# Patient Record
Sex: FEMALE | Race: WHITE | NOT HISPANIC OR LATINO | Employment: FULL TIME | URBAN - METROPOLITAN AREA
[De-identification: names, ages, dates, MRNs, and addresses within clinical notes are randomized per-mention and may not be internally consistent; named-entity substitution may affect disease eponyms.]

---

## 2022-12-06 ENCOUNTER — OFFICE VISIT (OUTPATIENT)
Dept: URGENT CARE | Facility: CLINIC | Age: 32
End: 2022-12-06
Payer: COMMERCIAL

## 2022-12-06 VITALS
HEART RATE: 81 BPM | WEIGHT: 145 LBS | OXYGEN SATURATION: 97 % | RESPIRATION RATE: 18 BRPM | SYSTOLIC BLOOD PRESSURE: 95 MMHG | DIASTOLIC BLOOD PRESSURE: 56 MMHG | TEMPERATURE: 99 F | HEIGHT: 64 IN | BODY MASS INDEX: 24.75 KG/M2

## 2022-12-06 DIAGNOSIS — H65.22 LEFT CHRONIC SEROUS OTITIS MEDIA: ICD-10-CM

## 2022-12-06 DIAGNOSIS — H92.02 ACUTE OTALGIA, LEFT: Primary | ICD-10-CM

## 2022-12-06 PROCEDURE — 99203 OFFICE O/P NEW LOW 30 MIN: CPT | Mod: S$GLB,,, | Performed by: NURSE PRACTITIONER

## 2022-12-06 PROCEDURE — 99203 PR OFFICE/OUTPT VISIT, NEW, LEVL III, 30-44 MIN: ICD-10-PCS | Mod: S$GLB,,, | Performed by: NURSE PRACTITIONER

## 2022-12-06 RX ORDER — FLUTICASONE PROPIONATE 50 MCG
1 SPRAY, SUSPENSION (ML) NASAL DAILY
Qty: 18.2 ML | Refills: 0 | Status: SHIPPED | OUTPATIENT
Start: 2022-12-06 | End: 2022-12-13

## 2022-12-06 RX ORDER — CEPHALEXIN 500 MG/1
500 CAPSULE ORAL EVERY 12 HOURS
Qty: 14 CAPSULE | Refills: 0 | Status: SHIPPED | OUTPATIENT
Start: 2022-12-06 | End: 2022-12-13

## 2022-12-06 RX ORDER — AZELASTINE 1 MG/ML
1 SPRAY, METERED NASAL 2 TIMES DAILY
Qty: 30 ML | Refills: 0 | Status: SHIPPED | OUTPATIENT
Start: 2022-12-06 | End: 2023-10-10 | Stop reason: ALTCHOICE

## 2022-12-06 NOTE — PROGRESS NOTES
"Subjective:       Patient ID: Leanna Yee is a 32 y.o. female.    Vitals:  height is 5' 4" (1.626 m) and weight is 65.8 kg (145 lb). Her temperature is 98.6 °F (37 °C). Her blood pressure is 95/56 (abnormal) and her pulse is 81. Her respiration is 18 and oxygen saturation is 97%.     Chief Complaint: Otalgia    Pt states ear infection in October. Says ear is tender to touch, if touched at the back of ear and front.   Provider note begins below    Patient reports left ear pain that has been going on for weeks.  She was treated with Augmentin for an ear infection.  She states that is improved but has been a mild persistent pain.  She is been taking Mucinex D every day.  Patient states she moved down here from [place].    Otalgia   There is pain in the left ear. This is a new problem. The current episode started 1 to 4 weeks ago. The problem occurs constantly. The problem has been waxing and waning. There has been no fever. The pain is at a severity of 3/10. The pain is mild. Associated symptoms include coughing, headaches and neck pain. Pertinent negatives include no abdominal pain, diarrhea, ear discharge, hearing loss, rash, rhinorrhea, sore throat or vomiting. Associated symptoms comments: Dry cough  . She has tried antibiotics (augmentin) for the symptoms. The treatment provided mild relief. There is no history of a chronic ear infection, hearing loss or a tympanostomy tube.     HENT:  Positive for ear pain, tinnitus and sinus pressure. Negative for ear discharge, hearing loss, postnasal drip and sore throat.    Neck: Positive for neck pain.   Respiratory:  Positive for cough.    Gastrointestinal:  Negative for abdominal pain, vomiting and diarrhea.   Skin:  Negative for rash.   Neurological:  Positive for headaches.     Objective:      Physical Exam   Constitutional: She is oriented to person, place, and time.   HENT:   Head: Normocephalic and atraumatic.   Ears:   Right Ear: Tympanic membrane, external " ear and ear canal normal.   Left Ear: Tympanic membrane, external ear and ear canal normal.   Nose: Rhinorrhea and congestion present.   Mouth/Throat: Dental caries present.       Cardiovascular: Normal rate.   Pulmonary/Chest: Effort normal. No respiratory distress.   Abdominal: Normal appearance.   Neurological: She is alert and oriented to person, place, and time.   Skin: Skin is warm and dry.   Psychiatric: Her behavior is normal. Mood normal.       Assessment:       1. Acute otalgia, left    2. Left chronic serous otitis media          Plan:       Patient reports the pain is mild but persistent.    No redness noted in ear   Differentials include Barotrauma versus serous otitis media versus dental infection  Referral for ENT  ED precautions        Acute otalgia, left  -     fluticasone propionate (FLONASE) 50 mcg/actuation nasal spray; 1 spray (50 mcg total) by Each Nostril route once daily. for 7 days  Dispense: 18.2 mL; Refill: 0  -     azelastine (ASTELIN) 137 mcg (0.1 %) nasal spray; 1 spray (137 mcg total) by Nasal route 2 (two) times daily.  Dispense: 30 mL; Refill: 0  -     Ambulatory referral/consult to ENT  -     cephALEXin (KEFLEX) 500 MG capsule; Take 1 capsule (500 mg total) by mouth every 12 (twelve) hours. for 7 days  Dispense: 14 capsule; Refill: 0    Left chronic serous otitis media  -     fluticasone propionate (FLONASE) 50 mcg/actuation nasal spray; 1 spray (50 mcg total) by Each Nostril route once daily. for 7 days  Dispense: 18.2 mL; Refill: 0  -     azelastine (ASTELIN) 137 mcg (0.1 %) nasal spray; 1 spray (137 mcg total) by Nasal route 2 (two) times daily.  Dispense: 30 mL; Refill: 0  -     Ambulatory referral/consult to ENT  -     cephALEXin (KEFLEX) 500 MG capsule; Take 1 capsule (500 mg total) by mouth every 12 (twelve) hours. for 7 days  Dispense: 14 capsule; Refill: 0

## 2023-02-01 ENCOUNTER — OFFICE VISIT (OUTPATIENT)
Dept: OTOLARYNGOLOGY | Facility: CLINIC | Age: 33
End: 2023-02-01
Payer: COMMERCIAL

## 2023-02-01 VITALS — SYSTOLIC BLOOD PRESSURE: 105 MMHG | DIASTOLIC BLOOD PRESSURE: 70 MMHG | HEART RATE: 95 BPM

## 2023-02-01 DIAGNOSIS — J30.9 ALLERGIC RHINITIS, UNSPECIFIED SEASONALITY, UNSPECIFIED TRIGGER: ICD-10-CM

## 2023-02-01 DIAGNOSIS — H92.03 EAR DISCOMFORT, BILATERAL: ICD-10-CM

## 2023-02-01 DIAGNOSIS — R09.81 NASAL CONGESTION: Primary | ICD-10-CM

## 2023-02-01 DIAGNOSIS — L29.9 EAR ITCHING: ICD-10-CM

## 2023-02-01 PROCEDURE — 99999 PR PBB SHADOW E&M-EST. PATIENT-LVL III: CPT | Mod: PBBFAC,,, | Performed by: OTOLARYNGOLOGY

## 2023-02-01 PROCEDURE — 3078F DIAST BP <80 MM HG: CPT | Mod: CPTII,S$GLB,, | Performed by: OTOLARYNGOLOGY

## 2023-02-01 PROCEDURE — 3078F PR MOST RECENT DIASTOLIC BLOOD PRESSURE < 80 MM HG: ICD-10-PCS | Mod: CPTII,S$GLB,, | Performed by: OTOLARYNGOLOGY

## 2023-02-01 PROCEDURE — 99203 OFFICE O/P NEW LOW 30 MIN: CPT | Mod: S$GLB,,, | Performed by: OTOLARYNGOLOGY

## 2023-02-01 PROCEDURE — 1159F MED LIST DOCD IN RCRD: CPT | Mod: CPTII,S$GLB,, | Performed by: OTOLARYNGOLOGY

## 2023-02-01 PROCEDURE — 1159F PR MEDICATION LIST DOCUMENTED IN MEDICAL RECORD: ICD-10-PCS | Mod: CPTII,S$GLB,, | Performed by: OTOLARYNGOLOGY

## 2023-02-01 PROCEDURE — 99999 PR PBB SHADOW E&M-EST. PATIENT-LVL III: ICD-10-PCS | Mod: PBBFAC,,, | Performed by: OTOLARYNGOLOGY

## 2023-02-01 PROCEDURE — 3074F SYST BP LT 130 MM HG: CPT | Mod: CPTII,S$GLB,, | Performed by: OTOLARYNGOLOGY

## 2023-02-01 PROCEDURE — 99203 PR OFFICE/OUTPT VISIT, NEW, LEVL III, 30-44 MIN: ICD-10-PCS | Mod: S$GLB,,, | Performed by: OTOLARYNGOLOGY

## 2023-02-01 PROCEDURE — 1160F PR REVIEW ALL MEDS BY PRESCRIBER/CLIN PHARMACIST DOCUMENTED: ICD-10-PCS | Mod: CPTII,S$GLB,, | Performed by: OTOLARYNGOLOGY

## 2023-02-01 PROCEDURE — 1160F RVW MEDS BY RX/DR IN RCRD: CPT | Mod: CPTII,S$GLB,, | Performed by: OTOLARYNGOLOGY

## 2023-02-01 PROCEDURE — 3074F PR MOST RECENT SYSTOLIC BLOOD PRESSURE < 130 MM HG: ICD-10-PCS | Mod: CPTII,S$GLB,, | Performed by: OTOLARYNGOLOGY

## 2023-02-01 RX ORDER — AMOXICILLIN AND CLAVULANATE POTASSIUM 875; 125 MG/1; MG/1
TABLET, FILM COATED ORAL
COMMUNITY
End: 2023-10-10 | Stop reason: ALTCHOICE

## 2023-02-01 NOTE — PATIENT INSTRUCTIONS
Reviewed possible causes of ear itching and intermittent discomfort including ear bud pressure, possible TMJ issues.    Discussed congestion issues - possible allergies.  Flonase or Nasacort- 2 sprays each nostril daily. We discussed how to apply it appropriately by placing the spray nozzle inside the nostril pointing above the ear on the same side and gently breathing in through the nose (do NOT snort).    If ear and congestion problems persist or worsen, return to follow up visit.

## 2023-02-01 NOTE — PROGRESS NOTES
32-year-old female presents for evaluation of intermittent ear discomfort and itching.  She has noticed now that she has moved back to [place] from [place] that these problems have been persistent since about mid to late October 2022.  She also notices being congested in the morning.  She sometimes will have a headache.  This morning she then woke up with with a sore throat.  Prior to October she never really had ear issues or ear infections.  In the Fall, while in [place], she had ear pain was seen in urgent care & was told it was an ear inf (not sure if middle or outer); then was seen in  here in [facility] and was no significant findings & recommended here.   The left ear has been more troublesome.  She does not have any active drainage. No prior ENT surgeries.  Does not sneeze or itch a lot. No q-tips. Does wear ear buds nearly all day.    PMHx, PSHx, Meds, Allergies, SocHx, FamHx reviewed in EPIC    Review of Systems     Constitutional: Negative for chills and fever.      HENT: Positive for ear discharge, ear pain, sore throat and stuffy nose.  Negative for hearing loss, ringing in the ears and sinus infection.  Ear itch      Respiratory:  Negative for cough and shortness of breath.      Neurological: Positive for headaches.     PE: /70   Pulse 95    Gen: female, well nourished, well developed, NAD, cooperative, good historian  Ears:  R EAC w/ min cerumen on right with thinned skin inferior and lateral, L EAC with min thin layer of cerumen with thin skin inf & lateral, no erythema or flaky skin & TM translucent with normal bony landmarks bilaterally  Nose: external nose tension configuration, nasal septum mild undulation w/o obstruction, inferior turbinates mild edema, min clear rhinorrhea, no visible purulence or polyps  OC/OP:  MMM, tongue protrudes midline, palate raises symmetrically, tonsils small and symmetric, no erythema or exudate  Neck: supple, no TTP, no LAD or masses  Face:  no TTP, no  erythema or flushing  Respiratory: Breathing comfortably without retractions  Skin: facial skin intact without visible lesions or flushing  Lymph: no neck lympadenopathy  Neuro:  facial movement symmetric, speech fluid, gait stable, tongue protrudes midline  Psych: alert & oriented x 3, reasonable, normal affect    Impression:   1. Nasal congestion        2. Ear itching        3. Ear discomfort, bilateral        4. Allergic rhinitis, unspecified seasonality, unspecified trigger      possible          Discussion and Plan:       Reviewed possible causes of ear itching and intermittent discomfort including ear bud pressure, possible TMJ issues. If discomfort returns pt will assess for TMJ tenderness.    Discussed congestion issues - possible allergies.  Flonase or Nasacort 2 sprays each nostril daily. We discussed how to apply it appropriately by placing the spray nozzle inside the nostril pointing above the ear on the same side and gently breathing in through the nose (do NOT snort).    If ear and congestion problems persist or worsen, return to follow up visit.      Parts or all of this note were created by voice recognition software; typographical errors in translating may be present.

## 2023-08-08 ENCOUNTER — OFFICE VISIT (OUTPATIENT)
Dept: OBSTETRICS AND GYNECOLOGY | Facility: CLINIC | Age: 33
End: 2023-08-08
Payer: COMMERCIAL

## 2023-08-08 VITALS — WEIGHT: 154.31 LBS | HEIGHT: 64 IN | BODY MASS INDEX: 26.34 KG/M2

## 2023-08-08 DIAGNOSIS — Z01.419 ENCOUNTER FOR WELL WOMAN EXAM WITH ROUTINE GYNECOLOGICAL EXAM: Primary | ICD-10-CM

## 2023-08-08 PROCEDURE — 1159F PR MEDICATION LIST DOCUMENTED IN MEDICAL RECORD: ICD-10-PCS | Mod: CPTII,S$GLB,, | Performed by: OBSTETRICS & GYNECOLOGY

## 2023-08-08 PROCEDURE — 1160F RVW MEDS BY RX/DR IN RCRD: CPT | Mod: CPTII,S$GLB,, | Performed by: OBSTETRICS & GYNECOLOGY

## 2023-08-08 PROCEDURE — 87624 HPV HI-RISK TYP POOLED RSLT: CPT | Performed by: OBSTETRICS & GYNECOLOGY

## 2023-08-08 PROCEDURE — 99385 PR PREVENTIVE VISIT,NEW,18-39: ICD-10-PCS | Mod: S$GLB,,, | Performed by: OBSTETRICS & GYNECOLOGY

## 2023-08-08 PROCEDURE — 88175 CYTOPATH C/V AUTO FLUID REDO: CPT | Performed by: OBSTETRICS & GYNECOLOGY

## 2023-08-08 PROCEDURE — 99385 PREV VISIT NEW AGE 18-39: CPT | Mod: S$GLB,,, | Performed by: OBSTETRICS & GYNECOLOGY

## 2023-08-08 PROCEDURE — 3008F PR BODY MASS INDEX (BMI) DOCUMENTED: ICD-10-PCS | Mod: CPTII,S$GLB,, | Performed by: OBSTETRICS & GYNECOLOGY

## 2023-08-08 PROCEDURE — 1160F PR REVIEW ALL MEDS BY PRESCRIBER/CLIN PHARMACIST DOCUMENTED: ICD-10-PCS | Mod: CPTII,S$GLB,, | Performed by: OBSTETRICS & GYNECOLOGY

## 2023-08-08 PROCEDURE — 99999 PR PBB SHADOW E&M-EST. PATIENT-LVL III: ICD-10-PCS | Mod: PBBFAC,,, | Performed by: OBSTETRICS & GYNECOLOGY

## 2023-08-08 PROCEDURE — 1159F MED LIST DOCD IN RCRD: CPT | Mod: CPTII,S$GLB,, | Performed by: OBSTETRICS & GYNECOLOGY

## 2023-08-08 PROCEDURE — 3008F BODY MASS INDEX DOCD: CPT | Mod: CPTII,S$GLB,, | Performed by: OBSTETRICS & GYNECOLOGY

## 2023-08-08 PROCEDURE — 99999 PR PBB SHADOW E&M-EST. PATIENT-LVL III: CPT | Mod: PBBFAC,,, | Performed by: OBSTETRICS & GYNECOLOGY

## 2023-08-08 RX ORDER — SPIRONOLACTONE 100 MG/1
100 TABLET, FILM COATED ORAL DAILY
COMMUNITY

## 2023-08-08 NOTE — PROGRESS NOTES
History & Physical  Gynecology      SUBJECTIVE:     Chief Complaint: Gynecologic Exam (Annual exam )       History of Present Illness:  Leanna Yee is a 33 y.o. female  here for annual routine Pap and checkup. Patient's last menstrual period was 2023 (exact date)..  She has no unusual complaints.      She describes her periods as regular, lasting 5-7 days. normal flow.  denies break through bleeding.   denies vaginal itching or irritation.  Notes intermittent vaginal itching.  Has had yeast infections in the past  denies vaginal discharge.    She is sexually active with 1 partner (male)  She uses IUD for contraception.  Paragard IUD placed 2022    History of abnormal pap: No  Last Pap: 2022  Last MMG: No  Last Colonoscopy:  No        Review of patient's allergies indicates:   Allergen Reactions    Prochlorperazine        History reviewed. No pertinent past medical history.  History reviewed. No pertinent surgical history.  OB History          0    Para   0    Term   0       0    AB   0    Living   0         SAB   0    IAB   0    Ectopic   0    Multiple   0    Live Births   0               Family History   Problem Relation Age of Onset    Heart disease Father      Social History     Tobacco Use    Smoking status: Never     Passive exposure: Never    Smokeless tobacco: Never   Substance Use Topics    Alcohol use: Not Currently    Drug use: Never       Current Outpatient Medications   Medication Sig    spironolactone (ALDACTONE) 100 MG tablet Take 100 mg by mouth once daily.    amoxicillin-clavulanate 875-125mg (AUGMENTIN) 875-125 mg per tablet Augmentin 875 mg-125 mg tablet   Take 1 tablet every 12 hours by oral route.    azelastine (ASTELIN) 137 mcg (0.1 %) nasal spray 1 spray (137 mcg total) by Nasal route 2 (two) times daily.     No current facility-administered medications for this visit.         Review of Systems:  Review of Systems   Constitutional:  Negative for activity  change, appetite change, chills, fatigue, fever and unexpected weight change.   Respiratory:  Negative for cough, shortness of breath and wheezing.    Cardiovascular:  Negative for chest pain and leg swelling.   Gastrointestinal:  Negative for abdominal pain, constipation, diarrhea, nausea and vomiting.   Endocrine: Negative for hair loss and hot flashes.   Genitourinary:  Negative for decreased libido, dyspareunia, dysuria, frequency, menstrual problem, pelvic pain, vaginal bleeding, vaginal discharge and vaginal pain.   Integumentary:  Negative for acne, hair changes, nipple discharge and breast skin changes.   Neurological:  Negative for headaches.   Psychiatric/Behavioral:  Negative for sleep disturbance.    Breast: Negative for mastodynia, nipple discharge and skin changes       OBJECTIVE:     Physical Exam:  Physical Exam  Constitutional:       Appearance: She is well-developed.   HENT:      Head: Normocephalic and atraumatic.   Eyes:      General: No scleral icterus.        Right eye: No discharge.         Left eye: No discharge.      Conjunctiva/sclera: Conjunctivae normal.   Pulmonary:      Effort: Pulmonary effort is normal.      Breath sounds: No stridor.   Chest:      Chest wall: No mass or tenderness.   Breasts:     Breasts are symmetrical.      Right: No inverted nipple, mass, nipple discharge, skin change or tenderness.      Left: No inverted nipple, mass, nipple discharge, skin change or tenderness.   Abdominal:      General: There is no distension.      Palpations: Abdomen is soft.      Tenderness: There is no abdominal tenderness.   Genitourinary:     Labia:         Right: No rash, tenderness, lesion or injury.         Left: No rash, tenderness, lesion or injury.       Vagina: Normal.      Cervix: No cervical motion tenderness, discharge or friability.      Adnexa:         Right: No mass, tenderness or fullness.          Left: No mass, tenderness or fullness.        Comments: Normal external  genitalia.  Normal hair distribution.  Urethral meatus normal. No cervical lesions or masses.  IUD strings noted.  No vaginal bleeding noted.  No adnexal or uterine tenderness.  No palpable adnexal masses.    Musculoskeletal:         General: Normal range of motion.   Skin:     General: Skin is warm and dry.   Neurological:      Mental Status: She is alert and oriented to person, place, and time.   Psychiatric:         Behavior: Behavior normal.         Thought Content: Thought content normal.         Judgment: Judgment normal.           ASSESSMENT:       ICD-10-CM ICD-9-CM    1. Encounter for well woman exam with routine gynecological exam  Z01.419 V72.31 Liquid-Based Pap Smear, Screening      HPV High Risk Genotypes, PCR              Plan:      Leanna was seen today for gynecologic exam.    Diagnoses and all orders for this visit:    Encounter for well woman exam with routine gynecological exam  -     Liquid-Based Pap Smear, Screening  -     HPV High Risk Genotypes, PCR  - Cotesting today  - MMG and Cscope not indicated at this time  - IUD for contraception        Orders Placed This Encounter   Procedures    HPV High Risk Genotypes, PCR       No follow-ups on file.     Counseling time: 15 minutes    Merced Corbett

## 2023-08-15 LAB
FINAL PATHOLOGIC DIAGNOSIS: NORMAL
Lab: NORMAL

## 2023-08-16 LAB
HPV HR 12 DNA SPEC QL NAA+PROBE: NEGATIVE
HPV16 AG SPEC QL: NEGATIVE
HPV18 DNA SPEC QL NAA+PROBE: NEGATIVE

## 2023-10-10 ENCOUNTER — OFFICE VISIT (OUTPATIENT)
Dept: URGENT CARE | Facility: CLINIC | Age: 33
End: 2023-10-10
Payer: COMMERCIAL

## 2023-10-10 VITALS
HEART RATE: 89 BPM | BODY MASS INDEX: 26.29 KG/M2 | DIASTOLIC BLOOD PRESSURE: 65 MMHG | OXYGEN SATURATION: 99 % | TEMPERATURE: 98 F | RESPIRATION RATE: 18 BRPM | SYSTOLIC BLOOD PRESSURE: 98 MMHG | HEIGHT: 64 IN | WEIGHT: 154 LBS

## 2023-10-10 DIAGNOSIS — H65.01 NON-RECURRENT ACUTE SEROUS OTITIS MEDIA OF RIGHT EAR: Primary | ICD-10-CM

## 2023-10-10 DIAGNOSIS — H92.01 OTALGIA, RIGHT: ICD-10-CM

## 2023-10-10 DIAGNOSIS — J02.9 SORE THROAT: ICD-10-CM

## 2023-10-10 LAB
CTP QC/QA: YES
CTP QC/QA: YES
MOLECULAR STREP A: NEGATIVE
SARS-COV-2 AG RESP QL IA.RAPID: NEGATIVE

## 2023-10-10 PROCEDURE — 87811 SARS CORONAVIRUS 2 ANTIGEN POCT, MANUAL READ: ICD-10-PCS | Mod: QW,S$GLB,, | Performed by: NURSE PRACTITIONER

## 2023-10-10 PROCEDURE — 87651 STREP A DNA AMP PROBE: CPT | Mod: QW,S$GLB,, | Performed by: NURSE PRACTITIONER

## 2023-10-10 PROCEDURE — 87651 POCT STREP A MOLECULAR: ICD-10-PCS | Mod: QW,S$GLB,, | Performed by: NURSE PRACTITIONER

## 2023-10-10 PROCEDURE — 99213 OFFICE O/P EST LOW 20 MIN: CPT | Mod: S$GLB,,, | Performed by: NURSE PRACTITIONER

## 2023-10-10 PROCEDURE — 87811 SARS-COV-2 COVID19 W/OPTIC: CPT | Mod: QW,S$GLB,, | Performed by: NURSE PRACTITIONER

## 2023-10-10 PROCEDURE — 99213 PR OFFICE/OUTPT VISIT, EST, LEVL III, 20-29 MIN: ICD-10-PCS | Mod: S$GLB,,, | Performed by: NURSE PRACTITIONER

## 2023-10-10 RX ORDER — AMOXICILLIN AND CLAVULANATE POTASSIUM 875; 125 MG/1; MG/1
1 TABLET, FILM COATED ORAL 2 TIMES DAILY
Qty: 20 TABLET | Refills: 0 | Status: SHIPPED | OUTPATIENT
Start: 2023-10-10 | End: 2023-10-20

## 2023-10-10 RX ORDER — CLINDAMYCIN PHOSPHATE AND BENZOYL PEROXIDE 10; 50 MG/G; MG/G
GEL TOPICAL
COMMUNITY
Start: 2023-07-19

## 2023-10-10 RX ORDER — PREDNISONE 20 MG/1
20 TABLET ORAL DAILY
Qty: 3 TABLET | Refills: 0 | Status: SHIPPED | OUTPATIENT
Start: 2023-10-10 | End: 2023-10-13

## 2023-10-10 NOTE — PROGRESS NOTES
"Subjective:      Patient ID: Leanna Yee is a 33 y.o. female.    Vitals:  height is 5' 4" (1.626 m) and weight is 69.9 kg (154 lb). Her tympanic temperature is 98.1 °F (36.7 °C). Her blood pressure is 98/65 and her pulse is 89. Her respiration is 18 and oxygen saturation is 99%.     Chief Complaint: Sore Throat    Patient presents with c.o sore throat x 1 day. Patient reprts pain worse on the right side when swallowing which is accompanied with rt ear pain. Patient denies congestion, cough, or body aches. No fever.   Provider note begins below    Patient reports right ear pain.  Denies headaches or body aches.  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      Sore Throat   This is a new problem. The current episode started yesterday. The problem has been unchanged. The pain is worse on the right side. Associated symptoms include ear pain. Pertinent negatives include no congestion, coughing, diarrhea, ear discharge, shortness of breath or vomiting. She has had no exposure to strep. She has tried NSAIDs for the symptoms. The treatment provided no relief.       Constitution: Positive for fatigue. Negative for fever.   HENT:  Positive for ear pain and sore throat. Negative for ear discharge and congestion.    Cardiovascular:  Negative for chest pain and sob on exertion.   Respiratory:  Negative for cough and shortness of breath.    Gastrointestinal:  Negative for nausea, vomiting, constipation and diarrhea.   Musculoskeletal:  Negative for muscle ache.      Objective:     Physical Exam   Constitutional: She is oriented to person, place, and time.   HENT:   Head: Normocephalic and atraumatic.   Ears:   Right Ear: External ear and ear canal normal. There is tenderness. Tympanic membrane is not erythematous and not bulging. A middle ear effusion is present. impacted cerumen  Left Ear: Tympanic membrane, external ear and ear canal normal. Tympanic membrane is not erythematous and not bulging. impacted cerumen  Nose: No " rhinorrhea or congestion.   Mouth/Throat: Posterior oropharyngeal erythema present. No oropharyngeal exudate.   Cardiovascular: Normal rate and regular rhythm.   Pulmonary/Chest: Effort normal and breath sounds normal. No stridor. No respiratory distress. She has no wheezes. She has no rhonchi. She has no rales. She exhibits no tenderness.   Abdominal: Normal appearance.   Neurological: She is alert and oriented to person, place, and time.   Skin: Skin is warm and dry.   Psychiatric: Her behavior is normal. Mood normal.       Results for orders placed or performed in visit on 10/10/23   POCT Strep A, Molecular   Result Value Ref Range    Molecular Strep A, POC Negative Negative     Acceptable Yes    SARS Coronavirus 2 Antigen, POCT Manual Read   Result Value Ref Range    SARS Coronavirus 2 Antigen Negative Negative     Acceptable Yes       Assessment:     1. Non-recurrent acute serous otitis media of right ear    2. Sore throat    3. Otalgia, right        Plan:   Strep test negative   Covid test negative  Follow-up if symptoms worsen or do not improve  Sudafed 30 milligrams prior to getting on plane and during the day as needed for congestion  Serous otitis media- wait and see antibiotics      A cold is caused by a virus that can settle in your nose, throat or lungs. This causesa runny or stuffy nose and sneezing. You may also have a sore throat, cough, headache, fever and muscle aches. Different cold viruses last different lengthsof time, but the average time is 2 to 14 days.    Seek immediate medical care if you develop fever, chest pain, or shortness of breath.     Treatment: There is no cure for the common cold, there is only symptomatic care.     Antibiotics may be used to treat signs of a secondary infection, but they do not treat  the cold virus. Try these tips to keep yourself comfortable:                   -Get plenty of rest.                   -Drink plenty of fluids, at least  8 large glasses of fluid a day. Good fluid choices are water, fruit juices high in Vitamin C, tea, gelatin, or broths and soups. These help to keep mucus thin and ease congestion.                  -Use salt water gargle, cough drops or throat sprays to relieve throat pain. Mi ¼ to ½ teaspoon of salt in 1 cup of warm water for a salt water gargle  solution.                  -Use petroleum jelly or lip balm around lips and nose to prevent chapping.                  -Use saline nose drops or spray to help ease congestion.    Over the Counter (OTC) Medicines:  Take over the counter medicines as needed to ease your signs.  Read labels carefully.  Use a product that treats only the signs that you have. Ask your pharmacist  for recommendations. Be sure to ask about possible interactions with other  medicines you are taking.  Common medicines used to treat signs of a cold include:    #Antihistamines that dry secretions in your nose and lungs. Some of these  may cause you to feel drowsy. Talk to your pharmacist before use if you  have glaucoma or an enlarged prostate.  Names of some medicines in this group include:  - Diphenhydramine  - Brompheniramine  - Chlorpheniramine  - Clemastine    # Decongestants that tighten blood vessels in your nose to decrease  stuffiness and pressure. Use nasal spray decongestants for up to three days  only. Longer use can make congestion worse. Talk to your pharmacist  before use if you have high blood pressure, heart disease, diabetes or an  enlarged prostate.    Names of some medicines in this group include:  - Pseudoephedrine (Sudafed)- kept behind the counter and requires identification  to purchase in limited quantities because it can be used to make illegal  drugs  - Phenylephrine  - Oxymetazoline nasal spray (Afrin)  - Cough suppressant, also called antitussive, such as dextromethorphan.  This medicine decreases your reflex and sensitivity to cough. This  medicine may be kept behind the  pharmacy counter for purchase.  - Expectorant, sometimes called mucolytic, such as guaifenesin (mucinex). This  medicine thins mucus secretions in the lungs to make it easier for you to  cough up and out. (Be sure to drink plenty of fluids when taking this medication)  Cold and cough medicines often contain more than one type of medicine.  Ask the pharmacist for help to confirm that you are not using more than one  product with the same or similar ingredient. For example, some cold and  cough medicines have acetaminophen or ibuprofen in them to help lower a  fever or ease muscle aches. Do not take extra acetaminophen (Tylenol) or  ibuprofen (Advil, Motrin) if the cold or cough medicine has it as an  ingredient. Too much medicine could be harmful.    Take the correct dose as listed on the package. Do not take more than  recommended.    Use a Humidifier:  A cool mist humidifier can make breathing easier by thinning mucus. Do not use  a steam humidifier as hot water can cause burns if spilled.  Place the humidifier a few feet from the bed. Drain and clean each day with  soap and water to prevent bacteria and mold from growing.  Indoor humidity should not be above 50%. Stop using the humidifier if you  notice moisture on windows, walls or pictures.  You do not need to add any medicine to the humidifier.  If you cannot get a humidifier, place a pan of water next to heating vents and  refill the water level daily. The water will evaporate and add moisture to the  Room.    How to prevent the spread of colds  -Wash your hands with soap and water or use alcohol based hand   often. Dry hands wet from washing with soap on a paper towel instead of cloth towel.  -Cough or sneeze into your elbow to avoid spreading germs.  -Wipe down common surfaces, such as door knobs and faucet handles, with a disinfectant spray.  -Do not share cups or utensils.        -Flonase daily.  -Claritin or Zyrtec daily.  --3 days of  steroids.  -Magic mouthwash as needed for sore throat.     Please follow up with your Primary care provider within 2-5 days if your signs and symptoms have not resolved or worsen.      If your condition worsens or fails to improve we recommend that you receive another evaluation at the emergency room immediately or contact your primary medical clinic to discuss your concerns.   You must understand that you have received an Urgent Care treatment only and that you may be released before all of your medical problems are known or treated. You, the patient, will arrange for follow up care as instructed.            Non-recurrent acute serous otitis media of right ear  -     amoxicillin-clavulanate 875-125mg (AUGMENTIN) 875-125 mg per tablet; Take 1 tablet by mouth 2 (two) times daily. for 10 days  Dispense: 20 tablet; Refill: 0    Sore throat  -     POCT Strep A, Molecular  -     SARS Coronavirus 2 Antigen, POCT Manual Read  -     (Magic mouthwash) 1:1:1 diphenhydrAMINE(Benadryl) 12.5mg/5ml liq, aluminum & magnesium hydroxide-simethicone (Maalox), LIDOcaine viscous 2%; Swish and spit 10 mLs every 4 (four) hours as needed (sore throat).  Dispense: 200 mL; Refill: 0  -     predniSONE (DELTASONE) 20 MG tablet; Take 1 tablet (20 mg total) by mouth once daily. for 3 days  Dispense: 3 tablet; Refill: 0    Otalgia, right

## 2023-10-10 NOTE — PATIENT INSTRUCTIONS
Sudafed 30 mg 30 min prior to boarding plane    A cold is caused by a virus that can settle in your nose, throat or lungs. This causesa runny or stuffy nose and sneezing. You may also have a sore throat, cough, headache, fever and muscle aches. Different cold viruses last different lengthsof time, but the average time is 2 to 14 days.    Seek immediate medical care if you develop fever, chest pain, or shortness of breath.     Treatment: There is no cure for the common cold, there is only symptomatic care.     Antibiotics may be used to treat signs of a secondary infection, but they do not treat  the cold virus. Try these tips to keep yourself comfortable:                   -Get plenty of rest.                   -Drink plenty of fluids, at least 8 large glasses of fluid a day. Good fluid choices are water, fruit juices high in Vitamin C, tea, gelatin, or broths and soups. These help to keep mucus thin and ease congestion.                  -Use salt water gargle, cough drops or throat sprays to relieve throat pain. Mi ¼ to ½ teaspoon of salt in 1 cup of warm water for a salt water gargle  solution.                  -Use petroleum jelly or lip balm around lips and nose to prevent chapping.                  -Use saline nose drops or spray to help ease congestion.    Over the Counter (OTC) Medicines:  Take over the counter medicines as needed to ease your signs.  Read labels carefully.  Use a product that treats only the signs that you have. Ask your pharmacist  for recommendations. Be sure to ask about possible interactions with other  medicines you are taking.  Common medicines used to treat signs of a cold include:    #Antihistamines that dry secretions in your nose and lungs. Some of these  may cause you to feel drowsy. Talk to your pharmacist before use if you  have glaucoma or an enlarged prostate.  Names of some medicines in this group include:  - Diphenhydramine  - Brompheniramine  - Chlorpheniramine  -  Clemastine    # Decongestants that tighten blood vessels in your nose to decrease  stuffiness and pressure. Use nasal spray decongestants for up to three days  only. Longer use can make congestion worse. Talk to your pharmacist  before use if you have high blood pressure, heart disease, diabetes or an  enlarged prostate.    Names of some medicines in this group include:  - Pseudoephedrine (Sudafed)- kept behind the counter and requires identification  to purchase in limited quantities because it can be used to make illegal  drugs  - Phenylephrine  - Oxymetazoline nasal spray (Afrin)  - Cough suppressant, also called antitussive, such as dextromethorphan.  This medicine decreases your reflex and sensitivity to cough. This  medicine may be kept behind the pharmacy counter for purchase.  - Expectorant, sometimes called mucolytic, such as guaifenesin (mucinex). This  medicine thins mucus secretions in the lungs to make it easier for you to  cough up and out. (Be sure to drink plenty of fluids when taking this medication)  Cold and cough medicines often contain more than one type of medicine.  Ask the pharmacist for help to confirm that you are not using more than one  product with the same or similar ingredient. For example, some cold and  cough medicines have acetaminophen or ibuprofen in them to help lower a  fever or ease muscle aches. Do not take extra acetaminophen (Tylenol) or  ibuprofen (Advil, Motrin) if the cold or cough medicine has it as an  ingredient. Too much medicine could be harmful.    Take the correct dose as listed on the package. Do not take more than  recommended.    Use a Humidifier:  A cool mist humidifier can make breathing easier by thinning mucus. Do not use  a steam humidifier as hot water can cause burns if spilled.  Place the humidifier a few feet from the bed. Drain and clean each day with  soap and water to prevent bacteria and mold from growing.  Indoor humidity should not be above 50%.  Stop using the humidifier if you  notice moisture on windows, walls or pictures.  You do not need to add any medicine to the humidifier.  If you cannot get a humidifier, place a pan of water next to heating vents and  refill the water level daily. The water will evaporate and add moisture to the  Room.    How to prevent the spread of colds  -Wash your hands with soap and water or use alcohol based hand   often. Dry hands wet from washing with soap on a paper towel instead of cloth towel.  -Cough or sneeze into your elbow to avoid spreading germs.  -Wipe down common surfaces, such as door knobs and faucet handles, with a disinfectant spray.  -Do not share cups or utensils.        -Flonase daily.  -Claritin or Zyrtec daily.    -3 days of steroids.  -Magic mouthwash as needed for sore throat.     Please follow up with your Primary care provider within 2-5 days if your signs and symptoms have not resolved or worsen.      If your condition worsens or fails to improve we recommend that you receive another evaluation at the emergency room immediately or contact your primary medical clinic to discuss your concerns.   You must understand that you have received an Urgent Care treatment only and that you may be released before all of your medical problems are known or treated. You, the patient, will arrange for follow up care as instructed.

## 2024-01-24 ENCOUNTER — PROCEDURE VISIT (OUTPATIENT)
Dept: OBSTETRICS AND GYNECOLOGY | Facility: CLINIC | Age: 34
End: 2024-01-24
Payer: COMMERCIAL

## 2024-01-24 VITALS
SYSTOLIC BLOOD PRESSURE: 108 MMHG | WEIGHT: 149.94 LBS | HEIGHT: 64 IN | DIASTOLIC BLOOD PRESSURE: 68 MMHG | BODY MASS INDEX: 25.6 KG/M2

## 2024-01-24 DIAGNOSIS — Z30.432 ENCOUNTER FOR IUD REMOVAL: Primary | ICD-10-CM

## 2024-01-24 PROCEDURE — 99499 UNLISTED E&M SERVICE: CPT | Mod: S$GLB,,, | Performed by: STUDENT IN AN ORGANIZED HEALTH CARE EDUCATION/TRAINING PROGRAM

## 2024-01-24 PROCEDURE — 58301 REMOVE INTRAUTERINE DEVICE: CPT | Mod: S$GLB,,, | Performed by: STUDENT IN AN ORGANIZED HEALTH CARE EDUCATION/TRAINING PROGRAM

## 2024-01-24 NOTE — PROCEDURES
Removal of IUD    Date/Time: 1/24/2024 3:00 PM    Performed by: Grazyna Briceno MD  Authorized by: Grazyna Briceno MD    Consent given by:  Patient  Procedure risks and benefits discussed: yes    Patient questions answered: yes    Patient agrees, verbalizes understanding, and wants to proceed: yes    Implant grasped by: ring forceps  Removal due to infection and inflammatory reaction: no    Other reason for removal:  Patient desires conception  Removal due to mechanical complications of IUD/Nexplanon: no    Removed with no complications: yes     Recommend discontinuing spironolactone and starting PNV.no      Grazyna Briceno MD

## 2024-03-04 ENCOUNTER — OFFICE VISIT (OUTPATIENT)
Dept: OBSTETRICS AND GYNECOLOGY | Facility: CLINIC | Age: 34
End: 2024-03-04
Attending: STUDENT IN AN ORGANIZED HEALTH CARE EDUCATION/TRAINING PROGRAM
Payer: COMMERCIAL

## 2024-03-04 ENCOUNTER — TELEPHONE (OUTPATIENT)
Dept: OBSTETRICS AND GYNECOLOGY | Facility: CLINIC | Age: 34
End: 2024-03-04

## 2024-03-04 ENCOUNTER — TELEPHONE (OUTPATIENT)
Dept: OBSTETRICS AND GYNECOLOGY | Facility: CLINIC | Age: 34
End: 2024-03-04
Payer: COMMERCIAL

## 2024-03-04 VITALS
SYSTOLIC BLOOD PRESSURE: 110 MMHG | WEIGHT: 150.69 LBS | BODY MASS INDEX: 25.73 KG/M2 | DIASTOLIC BLOOD PRESSURE: 70 MMHG | HEIGHT: 64 IN

## 2024-03-04 DIAGNOSIS — Z31.69 ENCOUNTER FOR PRECONCEPTION CONSULTATION: Primary | ICD-10-CM

## 2024-03-04 DIAGNOSIS — Z31.9 PATIENT DESIRES PREGNANCY: ICD-10-CM

## 2024-03-04 PROCEDURE — 3078F DIAST BP <80 MM HG: CPT | Mod: CPTII,S$GLB,, | Performed by: STUDENT IN AN ORGANIZED HEALTH CARE EDUCATION/TRAINING PROGRAM

## 2024-03-04 PROCEDURE — 99999 PR PBB SHADOW E&M-EST. PATIENT-LVL III: CPT | Mod: PBBFAC,,, | Performed by: STUDENT IN AN ORGANIZED HEALTH CARE EDUCATION/TRAINING PROGRAM

## 2024-03-04 PROCEDURE — 99214 OFFICE O/P EST MOD 30 MIN: CPT | Mod: S$GLB,,, | Performed by: STUDENT IN AN ORGANIZED HEALTH CARE EDUCATION/TRAINING PROGRAM

## 2024-03-04 PROCEDURE — 3008F BODY MASS INDEX DOCD: CPT | Mod: CPTII,S$GLB,, | Performed by: STUDENT IN AN ORGANIZED HEALTH CARE EDUCATION/TRAINING PROGRAM

## 2024-03-04 PROCEDURE — 1159F MED LIST DOCD IN RCRD: CPT | Mod: CPTII,S$GLB,, | Performed by: STUDENT IN AN ORGANIZED HEALTH CARE EDUCATION/TRAINING PROGRAM

## 2024-03-04 PROCEDURE — 3074F SYST BP LT 130 MM HG: CPT | Mod: CPTII,S$GLB,, | Performed by: STUDENT IN AN ORGANIZED HEALTH CARE EDUCATION/TRAINING PROGRAM

## 2024-03-04 NOTE — PROGRESS NOTES
Chief Complaint: Desires pregnancy     HPI:      Leanna Yee is a 33 y.o.  who presents today for preconception counseling.  Had IUD removed in January and is interested in pregnancy.  Also came off spironolactone.  Is taking PNV.  She reports a history of regular cycles.  Has had 1 cycle since IUD removal.  Has had no prior pregnancies.  Partner is healthy as well and no medical problem.  No children of his own. She is up to date on vaccinations.  Had chicken pox twice.  LMP: Patient's last menstrual period was 2024.  No other issues, problems, or complaints.     Family history of breast cancer in both paternal and maternal grandmothers.  History of migraine with aura.      OB History          0    Para   0    Term   0       0    AB   0    Living   0         SAB   0    IAB   0    Ectopic   0    Multiple   0    Live Births   0               History reviewed. No pertinent past medical history.  Past Surgical History:   Procedure Laterality Date    APPENDECTOMY       Social History     Socioeconomic History    Marital status:    Tobacco Use    Smoking status: Never     Passive exposure: Never    Smokeless tobacco: Never   Substance and Sexual Activity    Alcohol use: Yes     Comment: socially    Drug use: Never    Sexual activity: Yes     Partners: Male     Birth control/protection: None     Family History   Problem Relation Age of Onset    Heart disease Father     Breast cancer Neg Hx     Colon cancer Neg Hx     Ovarian cancer Neg Hx        Current Outpatient Medications:     clindamycin-benzoyl peroxide gel, Apply topically., Disp: , Rfl:     spironolactone (ALDACTONE) 100 MG tablet, Take 100 mg by mouth once daily., Disp: , Rfl:     ROS:     GENERAL: Denies unintentional weight gain or weight loss. Feeling well overall.   SKIN: Denies rash or lesions.   HEENT: Denies headaches, or vision changes.   ABDOMEN: Denies abdominal pain, constipation, diarrhea, nausea, vomiting,  "change in appetite.  URINARY: Denies frequency, dysuria, hematuria.  NEUROLOGIC: Denies syncope or weakness.   PSYCHIATRIC: Denies depression, anxiety or mood swings.    Physical Exam:      PHYSICAL EXAM:  /70   Ht 5' 4" (1.626 m)   Wt 68.3 kg (150 lb 11 oz)   LMP 2024   BMI 25.86 kg/m²   Body mass index is 25.86 kg/m².     APPEARANCE: Well nourished, well developed, in no acute distress.  PSYCH: Appropriate mood and affect.  SKIN: No acne or hirsutism.      Assessment/Plan:     33 y.o.     Encounter for preconception consultation    Patient desires pregnancy          Counseling:     Pre-Conception Counseling  Patient was counseled today on proper weight. Healthy diet emphasized.   Recommended prenatal vitamins with folic acid starting at least 3 months prior to conception.  Safety of exercise discussed with patient, and continued active lifestyle encouraged.   Zika virus precautions for both patient and her spouse reviewed, and patient was advised to let us know if she has any flu like sx in the 6 months prior to conception.  Avoidance of tobacco and alcohol when trying to conceive were discussed.   Optimal timing of intercourse reviewed. Also discussed use of OPKs.    Discussed carrier screening and patient will call if interested.    Reviewed medical history and immunization history - has had chicken pox and vaccinations.  Discussed titer levels if interested.   She will talk with partner and let me know.  Discussed when further testing would be indicated.  Reviewed AMA and indications for pregnancy planning and pregnancy.  All questions answered.    Follow up with PCP for other health maintenance.  RTC for annual exam or sooner if needed.    Face to Face time with patient: 20  30 minutes of total time spent on the encounter, which includes face to face time and non-face to face time preparing to see the patient (eg, review of tests), Obtaining and/or reviewing separately obtained history, " Documenting clinical information in the electronic or other health record, Independently interpreting results (not separately reported) and communicating results to the patient/family/caregiver, or Care coordination (not separately reported).

## 2024-03-04 NOTE — TELEPHONE ENCOUNTER
Lvm, pt does not need annual, pt had annual 08/2023, pt also had iud removed 01/24/24 in another office.

## 2024-04-17 ENCOUNTER — TELEPHONE (OUTPATIENT)
Dept: OBSTETRICS AND GYNECOLOGY | Facility: CLINIC | Age: 34
End: 2024-04-17
Payer: COMMERCIAL

## 2024-04-17 DIAGNOSIS — Z34.90 PREGNANCY, UNSPECIFIED GESTATIONAL AGE: Primary | ICD-10-CM

## 2024-04-22 ENCOUNTER — LAB VISIT (OUTPATIENT)
Dept: LAB | Facility: OTHER | Age: 34
End: 2024-04-22
Attending: STUDENT IN AN ORGANIZED HEALTH CARE EDUCATION/TRAINING PROGRAM
Payer: COMMERCIAL

## 2024-04-22 ENCOUNTER — TELEPHONE (OUTPATIENT)
Dept: OBSTETRICS AND GYNECOLOGY | Facility: CLINIC | Age: 34
End: 2024-04-22
Payer: COMMERCIAL

## 2024-04-22 DIAGNOSIS — O26.859 SPOTTING IN PREGNANCY: ICD-10-CM

## 2024-04-22 DIAGNOSIS — O26.859 SPOTTING IN PREGNANCY: Primary | ICD-10-CM

## 2024-04-22 LAB
ABO + RH BLD: NORMAL
BLD GP AB SCN CELLS X3 SERPL QL: NORMAL
HCG INTACT+B SERPL-ACNC: 20 MIU/ML
PROGEST SERPL-MCNC: 0.2 NG/ML
SPECIMEN OUTDATE: NORMAL

## 2024-04-22 PROCEDURE — 36415 COLL VENOUS BLD VENIPUNCTURE: CPT | Performed by: STUDENT IN AN ORGANIZED HEALTH CARE EDUCATION/TRAINING PROGRAM

## 2024-04-22 PROCEDURE — 84702 CHORIONIC GONADOTROPIN TEST: CPT | Performed by: STUDENT IN AN ORGANIZED HEALTH CARE EDUCATION/TRAINING PROGRAM

## 2024-04-22 PROCEDURE — 84144 ASSAY OF PROGESTERONE: CPT | Performed by: STUDENT IN AN ORGANIZED HEALTH CARE EDUCATION/TRAINING PROGRAM

## 2024-04-22 PROCEDURE — 86850 RBC ANTIBODY SCREEN: CPT | Performed by: STUDENT IN AN ORGANIZED HEALTH CARE EDUCATION/TRAINING PROGRAM

## 2024-04-22 NOTE — TELEPHONE ENCOUNTER
LMP 3/19   Started spotting Saturday and has continued today. Brown and bright red but mostly brown.  No new or worsening cramping.  Denies intercourse and straining with BM.  Reassurance given. Offered to check labs. Scheduled today.

## 2024-04-23 ENCOUNTER — TELEPHONE (OUTPATIENT)
Dept: OBSTETRICS AND GYNECOLOGY | Facility: CLINIC | Age: 34
End: 2024-04-23
Payer: COMMERCIAL

## 2024-04-23 DIAGNOSIS — Z32.01 POSITIVE PREGNANCY TEST: Primary | ICD-10-CM

## 2024-04-23 NOTE — TELEPHONE ENCOUNTER
Spoke with patient.  Had positive upt on Wednesday.  Then started bleeding Saturday night.  Light spotting.  Had some red bleeding, now brown.  Mild cramping.  Reviewed results and concern for sab.  Willl repeat hcg.  Keep scheduled appt.      Please add to [facility] schedule at 9A for HCG.  Thank you!

## 2024-04-24 ENCOUNTER — TELEPHONE (OUTPATIENT)
Dept: OBSTETRICS AND GYNECOLOGY | Facility: CLINIC | Age: 34
End: 2024-04-24
Payer: COMMERCIAL

## 2024-04-24 ENCOUNTER — LAB VISIT (OUTPATIENT)
Dept: LAB | Facility: OTHER | Age: 34
End: 2024-04-24
Attending: STUDENT IN AN ORGANIZED HEALTH CARE EDUCATION/TRAINING PROGRAM
Payer: COMMERCIAL

## 2024-04-24 DIAGNOSIS — Z32.01 POSITIVE PREGNANCY TEST: ICD-10-CM

## 2024-04-24 LAB — HCG INTACT+B SERPL-ACNC: 24 MIU/ML

## 2024-04-24 PROCEDURE — 36415 COLL VENOUS BLD VENIPUNCTURE: CPT | Performed by: STUDENT IN AN ORGANIZED HEALTH CARE EDUCATION/TRAINING PROGRAM

## 2024-04-24 PROCEDURE — 84702 CHORIONIC GONADOTROPIN TEST: CPT | Performed by: STUDENT IN AN ORGANIZED HEALTH CARE EDUCATION/TRAINING PROGRAM

## 2024-04-24 NOTE — TELEPHONE ENCOUNTER
Spoke with patient and all questions answered.  Keep scheduled follow up for tomorrow for us and visit.

## 2024-04-25 ENCOUNTER — PROCEDURE VISIT (OUTPATIENT)
Dept: OBSTETRICS AND GYNECOLOGY | Facility: CLINIC | Age: 34
End: 2024-04-25
Attending: STUDENT IN AN ORGANIZED HEALTH CARE EDUCATION/TRAINING PROGRAM
Payer: COMMERCIAL

## 2024-04-25 ENCOUNTER — OFFICE VISIT (OUTPATIENT)
Dept: OBSTETRICS AND GYNECOLOGY | Facility: CLINIC | Age: 34
End: 2024-04-25
Payer: COMMERCIAL

## 2024-04-25 VITALS
DIASTOLIC BLOOD PRESSURE: 76 MMHG | BODY MASS INDEX: 25.85 KG/M2 | SYSTOLIC BLOOD PRESSURE: 118 MMHG | WEIGHT: 150.56 LBS

## 2024-04-25 DIAGNOSIS — O26.859 SPOTTING IN PREGNANCY: ICD-10-CM

## 2024-04-25 DIAGNOSIS — O03.9 SPONTANEOUS ABORTION: Primary | ICD-10-CM

## 2024-04-25 PROCEDURE — 76801 OB US < 14 WKS SINGLE FETUS: CPT | Mod: S$GLB,,, | Performed by: STUDENT IN AN ORGANIZED HEALTH CARE EDUCATION/TRAINING PROGRAM

## 2024-04-25 PROCEDURE — 1160F RVW MEDS BY RX/DR IN RCRD: CPT | Mod: CPTII,S$GLB,, | Performed by: NURSE PRACTITIONER

## 2024-04-25 PROCEDURE — 3008F BODY MASS INDEX DOCD: CPT | Mod: CPTII,S$GLB,, | Performed by: NURSE PRACTITIONER

## 2024-04-25 PROCEDURE — 3074F SYST BP LT 130 MM HG: CPT | Mod: CPTII,S$GLB,, | Performed by: NURSE PRACTITIONER

## 2024-04-25 PROCEDURE — 99213 OFFICE O/P EST LOW 20 MIN: CPT | Mod: S$GLB,,, | Performed by: NURSE PRACTITIONER

## 2024-04-25 PROCEDURE — 76817 TRANSVAGINAL US OBSTETRIC: CPT | Mod: S$GLB,,, | Performed by: STUDENT IN AN ORGANIZED HEALTH CARE EDUCATION/TRAINING PROGRAM

## 2024-04-25 PROCEDURE — 3078F DIAST BP <80 MM HG: CPT | Mod: CPTII,S$GLB,, | Performed by: NURSE PRACTITIONER

## 2024-04-25 PROCEDURE — 99999 PR PBB SHADOW E&M-EST. PATIENT-LVL II: CPT | Mod: PBBFAC,,, | Performed by: NURSE PRACTITIONER

## 2024-04-25 PROCEDURE — 1159F MED LIST DOCD IN RCRD: CPT | Mod: CPTII,S$GLB,, | Performed by: NURSE PRACTITIONER

## 2024-04-25 NOTE — PROGRESS NOTES
History & Physical  Gynecology      SUBJECTIVE:     Chief Complaint: Pregnancy Confirmation       History of Present Illness: Patient presents for pregnancy confirmation. Recent HCG with low level x 2.  OBUS today without evidence of pregnancy. Reports period like bleeding in the past week additionally, improved.         Review of patient's allergies indicates:   Allergen Reactions    Prochlorperazine Other (See Comments)     Lost control of neck and eye muscles       No past medical history on file.  Past Surgical History:   Procedure Laterality Date    APPENDECTOMY       OB History          0    Para   0    Term   0       0    AB   0    Living   0         SAB   0    IAB   0    Ectopic   0    Multiple   0    Live Births   0               Family History   Problem Relation Name Age of Onset    Heart disease Father      Breast cancer Neg Hx      Colon cancer Neg Hx      Ovarian cancer Neg Hx       Social History     Tobacco Use    Smoking status: Never     Passive exposure: Never    Smokeless tobacco: Never   Substance Use Topics    Alcohol use: Yes     Comment: socially    Drug use: Never       Current Outpatient Medications   Medication Sig Dispense Refill    clindamycin-benzoyl peroxide gel Apply topically.       No current facility-administered medications for this visit.         Review of Systems:  Review of Systems   Constitutional:  Negative for activity change, appetite change, chills, fatigue and fever.   HENT:  Negative for mouth sores.    Eyes:  Negative for visual disturbance.   Respiratory:  Negative for cough and shortness of breath.    Cardiovascular:  Negative for chest pain and leg swelling.   Gastrointestinal:  Negative for abdominal pain, constipation, diarrhea, nausea, vomiting and reflux.   Endocrine: Negative for hyperthyroidism and hypothyroidism.   Genitourinary:  Negative for dysuria, flank pain, frequency, genital sores, hematuria, menstrual problem, pelvic pain, vaginal  bleeding, vaginal discharge, vaginal pain, vaginal dryness and vaginal odor.   Musculoskeletal:  Negative for arthralgias, back pain and myalgias.   Integumentary:  Negative for rash, breast mass and breast tenderness.   Neurological:  Negative for headaches.   Hematological:  Negative for adenopathy. Does not bruise/bleed easily.   Psychiatric/Behavioral:  Negative for depression. The patient is not nervous/anxious.    Breast: Negative for asymmetry, mass and tenderness       OBJECTIVE:     Physical Exam:  Physical Exam  Constitutional:       General: She is not in acute distress.     Appearance: She is well-developed. She is not diaphoretic.   HENT:      Head: Normocephalic and atraumatic.      Nose: Nose normal.   Eyes:      Conjunctiva/sclera: Conjunctivae normal.      Pupils: Pupils are equal, round, and reactive to light.   Neck:      Thyroid: No thyromegaly.      Vascular: No JVD.      Trachea: No tracheal deviation.   Cardiovascular:      Rate and Rhythm: Normal rate and regular rhythm.      Heart sounds: Normal heart sounds. No murmur heard.     No friction rub. No gallop.   Pulmonary:      Effort: Pulmonary effort is normal. No respiratory distress.      Breath sounds: Normal breath sounds. No stridor. No wheezing or rales.   Chest:      Chest wall: No tenderness.   Abdominal:      General: Bowel sounds are normal. There is no distension.      Palpations: Abdomen is soft. There is no mass.      Tenderness: There is no abdominal tenderness. There is no guarding or rebound.      Hernia: No hernia is present.   Genitourinary:     Vagina: Normal.   Musculoskeletal:         General: No tenderness. Normal range of motion.      Cervical back: Normal range of motion and neck supple.   Lymphadenopathy:      Cervical: No cervical adenopathy.   Skin:     General: Skin is warm and dry.      Capillary Refill: Capillary refill takes less than 2 seconds.      Coloration: Skin is not pale.      Findings: No erythema or  rash.   Neurological:      Mental Status: She is alert and oriented to person, place, and time.      Sensory: No sensory deficit.      Motor: No abnormal muscle tone.      Coordination: Coordination normal.      Deep Tendon Reflexes: Reflexes normal.   Psychiatric:         Behavior: Behavior normal.         Thought Content: Thought content normal.         Judgment: Judgment normal.           ASSESSMENT:       ICD-10-CM ICD-9-CM    1. Spontaneous   O03.9 634.90              Plan:      Discussed absence of pregnancy with OBUS today. Emotional support given. ED and pelvic rest precautions.    FU with Np as needed.    Counseling time: 20 minutes       Mckayla Burciaga, REINA-C

## 2024-05-03 ENCOUNTER — TELEPHONE (OUTPATIENT)
Dept: OBSTETRICS AND GYNECOLOGY | Facility: CLINIC | Age: 34
End: 2024-05-03
Payer: COMMERCIAL

## 2024-05-17 ENCOUNTER — LAB VISIT (OUTPATIENT)
Dept: LAB | Facility: OTHER | Age: 34
End: 2024-05-17
Attending: STUDENT IN AN ORGANIZED HEALTH CARE EDUCATION/TRAINING PROGRAM
Payer: COMMERCIAL

## 2024-05-17 DIAGNOSIS — Z32.01 POSITIVE PREGNANCY TEST: ICD-10-CM

## 2024-05-17 LAB
HCG INTACT+B SERPL-ACNC: 218 MIU/ML
PROGEST SERPL-MCNC: 17 NG/ML

## 2024-05-17 PROCEDURE — 84702 CHORIONIC GONADOTROPIN TEST: CPT | Performed by: STUDENT IN AN ORGANIZED HEALTH CARE EDUCATION/TRAINING PROGRAM

## 2024-05-17 PROCEDURE — 84144 ASSAY OF PROGESTERONE: CPT | Performed by: STUDENT IN AN ORGANIZED HEALTH CARE EDUCATION/TRAINING PROGRAM

## 2024-05-17 PROCEDURE — 36415 COLL VENOUS BLD VENIPUNCTURE: CPT | Performed by: STUDENT IN AN ORGANIZED HEALTH CARE EDUCATION/TRAINING PROGRAM

## 2024-05-20 ENCOUNTER — TELEPHONE (OUTPATIENT)
Dept: OBSTETRICS AND GYNECOLOGY | Facility: CLINIC | Age: 34
End: 2024-05-20
Payer: COMMERCIAL

## 2024-05-20 DIAGNOSIS — Z34.90 PREGNANCY, UNSPECIFIED GESTATIONAL AGE: Primary | ICD-10-CM

## 2024-05-20 NOTE — TELEPHONE ENCOUNTER
Spoke with patient.  She is doing well.  Will repeat HCG tomorrow.  Order placed.  All ques answered.

## 2024-05-20 NOTE — TELEPHONE ENCOUNTER
Spoke with pt.  Positive UPT.  H/o SAB on 4/21, hasn't had a period since SAB.  She had labs on 5/17.  progesterone 17.  She has several questions she would like to discuss with Dr. Garnett, offered appt to discuss. Scheduled Thursday with Dr. Garnett.      She asked how far along she is, advised based on LMP/SAB she is probably around 4 weeks but couldn't give her an exact date.      I put orders in for OB US with the intention to schedule around 7/8 weeks but did not schedule since she is coming in Thursday for a confirm.  Let me know if you want to repeat labs tomorrow.

## 2024-05-21 ENCOUNTER — LAB VISIT (OUTPATIENT)
Dept: LAB | Facility: OTHER | Age: 34
End: 2024-05-21
Attending: STUDENT IN AN ORGANIZED HEALTH CARE EDUCATION/TRAINING PROGRAM
Payer: COMMERCIAL

## 2024-05-21 DIAGNOSIS — Z34.90 PREGNANCY, UNSPECIFIED GESTATIONAL AGE: ICD-10-CM

## 2024-05-21 LAB — HCG INTACT+B SERPL-ACNC: 1261 MIU/ML

## 2024-05-21 PROCEDURE — 36415 COLL VENOUS BLD VENIPUNCTURE: CPT | Performed by: STUDENT IN AN ORGANIZED HEALTH CARE EDUCATION/TRAINING PROGRAM

## 2024-05-21 PROCEDURE — 84702 CHORIONIC GONADOTROPIN TEST: CPT | Performed by: STUDENT IN AN ORGANIZED HEALTH CARE EDUCATION/TRAINING PROGRAM

## 2024-05-23 ENCOUNTER — OFFICE VISIT (OUTPATIENT)
Dept: OBSTETRICS AND GYNECOLOGY | Facility: CLINIC | Age: 34
End: 2024-05-23
Attending: STUDENT IN AN ORGANIZED HEALTH CARE EDUCATION/TRAINING PROGRAM
Payer: COMMERCIAL

## 2024-05-23 ENCOUNTER — LAB VISIT (OUTPATIENT)
Dept: LAB | Facility: OTHER | Age: 34
End: 2024-05-23
Attending: STUDENT IN AN ORGANIZED HEALTH CARE EDUCATION/TRAINING PROGRAM
Payer: COMMERCIAL

## 2024-05-23 VITALS
HEIGHT: 64 IN | SYSTOLIC BLOOD PRESSURE: 112 MMHG | WEIGHT: 149.06 LBS | DIASTOLIC BLOOD PRESSURE: 68 MMHG | BODY MASS INDEX: 25.45 KG/M2

## 2024-05-23 DIAGNOSIS — Z32.01 POSITIVE PREGNANCY TEST: Primary | ICD-10-CM

## 2024-05-23 DIAGNOSIS — Z32.01 POSITIVE PREGNANCY TEST: ICD-10-CM

## 2024-05-23 LAB
B-HCG UR QL: POSITIVE
CTP QC/QA: YES
HCG INTACT+B SERPL-ACNC: 2925 MIU/ML

## 2024-05-23 PROCEDURE — 99213 OFFICE O/P EST LOW 20 MIN: CPT | Mod: S$GLB,,, | Performed by: STUDENT IN AN ORGANIZED HEALTH CARE EDUCATION/TRAINING PROGRAM

## 2024-05-23 PROCEDURE — 84702 CHORIONIC GONADOTROPIN TEST: CPT | Performed by: STUDENT IN AN ORGANIZED HEALTH CARE EDUCATION/TRAINING PROGRAM

## 2024-05-23 PROCEDURE — 3078F DIAST BP <80 MM HG: CPT | Mod: CPTII,S$GLB,, | Performed by: STUDENT IN AN ORGANIZED HEALTH CARE EDUCATION/TRAINING PROGRAM

## 2024-05-23 PROCEDURE — 99999 PR PBB SHADOW E&M-EST. PATIENT-LVL II: CPT | Mod: PBBFAC,,, | Performed by: STUDENT IN AN ORGANIZED HEALTH CARE EDUCATION/TRAINING PROGRAM

## 2024-05-23 PROCEDURE — 87086 URINE CULTURE/COLONY COUNT: CPT | Performed by: STUDENT IN AN ORGANIZED HEALTH CARE EDUCATION/TRAINING PROGRAM

## 2024-05-23 PROCEDURE — 3008F BODY MASS INDEX DOCD: CPT | Mod: CPTII,S$GLB,, | Performed by: STUDENT IN AN ORGANIZED HEALTH CARE EDUCATION/TRAINING PROGRAM

## 2024-05-23 PROCEDURE — 87088 URINE BACTERIA CULTURE: CPT | Performed by: STUDENT IN AN ORGANIZED HEALTH CARE EDUCATION/TRAINING PROGRAM

## 2024-05-23 PROCEDURE — 36415 COLL VENOUS BLD VENIPUNCTURE: CPT | Performed by: STUDENT IN AN ORGANIZED HEALTH CARE EDUCATION/TRAINING PROGRAM

## 2024-05-23 PROCEDURE — 3074F SYST BP LT 130 MM HG: CPT | Mod: CPTII,S$GLB,, | Performed by: STUDENT IN AN ORGANIZED HEALTH CARE EDUCATION/TRAINING PROGRAM

## 2024-05-23 PROCEDURE — 81025 URINE PREGNANCY TEST: CPT | Mod: S$GLB,,, | Performed by: STUDENT IN AN ORGANIZED HEALTH CARE EDUCATION/TRAINING PROGRAM

## 2024-05-23 PROCEDURE — 87491 CHLMYD TRACH DNA AMP PROBE: CPT | Performed by: STUDENT IN AN ORGANIZED HEALTH CARE EDUCATION/TRAINING PROGRAM

## 2024-05-23 PROCEDURE — 1159F MED LIST DOCD IN RCRD: CPT | Mod: CPTII,S$GLB,, | Performed by: STUDENT IN AN ORGANIZED HEALTH CARE EDUCATION/TRAINING PROGRAM

## 2024-05-23 PROCEDURE — 87591 N.GONORRHOEAE DNA AMP PROB: CPT | Performed by: STUDENT IN AN ORGANIZED HEALTH CARE EDUCATION/TRAINING PROGRAM

## 2024-05-23 NOTE — PROGRESS NOTES
"  Chief Complaint: Absence of Menses     HPI:      Leanna Yee is a 33 y.o.  who presents complaining of absence of menses.  SAB last month.  Has not a since then.  Has had HCG and progesterone and HCG is increasing.  No cramping or bleeding.    History reviewed. No pertinent past medical history.  Past Surgical History:   Procedure Laterality Date    APPENDECTOMY       Social History     Tobacco Use    Smoking status: Never     Passive exposure: Never    Smokeless tobacco: Never   Substance Use Topics    Alcohol use: Yes     Comment: socially    Drug use: Never     Family History   Problem Relation Name Age of Onset    Heart disease Father      Breast cancer Neg Hx      Colon cancer Neg Hx      Ovarian cancer Neg Hx       OB History    Para Term  AB Living   0 0 0 0 0 0   SAB IAB Ectopic Multiple Live Births   0 0 0 0 0       Current Outpatient Medications:     clindamycin-benzoyl peroxide gel, Apply topically., Disp: , Rfl:   ROS:     GENERAL: Denies weight gain or weight loss. Feeling well overall.   SKIN: Denies rash or lesions.   BREASTS: Denies lumps or nipple discharge. + tenderness.  ABDOMEN: Denies abdominal pain, constipation, diarrhea. no nausea/no vomiting  URINARY: Denies dysuria, hematuria. + frequency.  NEUROLOGIC: Denies syncope or weakness.   PSYCHIATRIC: Denies depression, anxiety or mood swings.    Physical Exam:      PHYSICAL EXAM:  /68   Ht 5' 4" (1.626 m)   Wt 67.6 kg (149 lb 0.5 oz)   LMP 2024 (Approximate)   BMI 25.58 kg/m²   Body mass index is 25.58 kg/m².     APPEARANCE: Well nourished, well developed, in no acute distress.  PSYCH: Appropriate mood and affect.  EXTREMITIES: No edema.    Assessment/Plan:       ICD-10-CM ICD-9-CM    1. Positive pregnancy test  Z32.01 V72.42 POCT Urine Pregnancy      HCG, QUANTITATIVE, PREGNANCY            Counseling:   Reviewed course of prenatal care.  Medical and surgical histories reviewed and all questions " answered.  Packet given and will review at next visit as well.    1. Patient was counseled today on proper weight gain based on the [employer]'s recommendations based on her pre-pregnancy weight.   2. Discussed foods to avoid in pregnancy (i.e. sushi, fish that are high in mercury, deli meat, and unpasteurized cheeses).   3. Discussed prenatal vitamin options and folic acid (i.e. stool softener, DHA).   4. Optional genetic testing discussed - patient will let us know if she is interested and she will call about pricing.  Understands this can be time sensitive.   5. Optional carrier screening discussed - patient will let us know if she is interested and she will call about pricing.  5. Safety of exercise discussed with patient, and continued active lifestyle encouraged.  6. Zika virus precautions for both patient and her spouse.  7. Normal course of prenatal care reviewed.  8. Medications safe in pregnancy discussed and list provided.  HCG today  RTC in 2 weeks for U/s or sooner if needed.

## 2024-05-25 ENCOUNTER — PATIENT MESSAGE (OUTPATIENT)
Dept: OBSTETRICS AND GYNECOLOGY | Facility: CLINIC | Age: 34
End: 2024-05-25
Payer: COMMERCIAL

## 2024-05-25 LAB — BACTERIA UR CULT: ABNORMAL

## 2024-05-27 LAB
C TRACH DNA SPEC QL NAA+PROBE: NOT DETECTED
N GONORRHOEA DNA SPEC QL NAA+PROBE: NOT DETECTED

## 2024-06-06 ENCOUNTER — ROUTINE PRENATAL (OUTPATIENT)
Dept: OBSTETRICS AND GYNECOLOGY | Facility: CLINIC | Age: 34
End: 2024-06-06
Attending: STUDENT IN AN ORGANIZED HEALTH CARE EDUCATION/TRAINING PROGRAM
Payer: COMMERCIAL

## 2024-06-06 VITALS
BODY MASS INDEX: 25.37 KG/M2 | WEIGHT: 147.81 LBS | SYSTOLIC BLOOD PRESSURE: 112 MMHG | DIASTOLIC BLOOD PRESSURE: 67 MMHG

## 2024-06-06 DIAGNOSIS — Z3A.01 6 WEEKS GESTATION OF PREGNANCY: Primary | ICD-10-CM

## 2024-06-06 DIAGNOSIS — Z34.90 PREGNANCY, UNSPECIFIED GESTATIONAL AGE: ICD-10-CM

## 2024-06-06 PROCEDURE — 87491 CHLMYD TRACH DNA AMP PROBE: CPT | Performed by: STUDENT IN AN ORGANIZED HEALTH CARE EDUCATION/TRAINING PROGRAM

## 2024-06-06 PROCEDURE — 99999 PR PBB SHADOW E&M-EST. PATIENT-LVL II: CPT | Mod: PBBFAC,,, | Performed by: STUDENT IN AN ORGANIZED HEALTH CARE EDUCATION/TRAINING PROGRAM

## 2024-06-06 PROCEDURE — 76801 OB US < 14 WKS SINGLE FETUS: CPT | Mod: S$GLB,,, | Performed by: OBSTETRICS & GYNECOLOGY

## 2024-06-06 PROCEDURE — 0502F SUBSEQUENT PRENATAL CARE: CPT | Mod: CPTII,S$GLB,, | Performed by: STUDENT IN AN ORGANIZED HEALTH CARE EDUCATION/TRAINING PROGRAM

## 2024-06-06 PROCEDURE — 87088 URINE BACTERIA CULTURE: CPT | Performed by: STUDENT IN AN ORGANIZED HEALTH CARE EDUCATION/TRAINING PROGRAM

## 2024-06-06 PROCEDURE — 87086 URINE CULTURE/COLONY COUNT: CPT | Performed by: STUDENT IN AN ORGANIZED HEALTH CARE EDUCATION/TRAINING PROGRAM

## 2024-06-06 NOTE — PROGRESS NOTES
Doing well.  No complaints.  U/S reviewed.  Reviewed course of pnc and pregnancy information packet.  Also reviewed medical and surgical histories.  Reviewed medications.  Interested in [id number].  Will do labs at 10 weeks.  R/b/a reviewed and all questions answered.  Also discussed asa 81 mg for pre e prevention starting at 14-16 weeks.  She is in agreement with plan.  All questions answered.  Rtc in 4 weeks or sooner if needed.

## 2024-06-07 LAB — BACTERIA UR CULT: ABNORMAL

## 2024-06-10 LAB
C TRACH DNA SPEC QL NAA+PROBE: NOT DETECTED
N GONORRHOEA DNA SPEC QL NAA+PROBE: NOT DETECTED

## 2024-06-11 ENCOUNTER — TELEPHONE (OUTPATIENT)
Dept: OBSTETRICS AND GYNECOLOGY | Facility: HOSPITAL | Age: 34
End: 2024-06-11
Payer: COMMERCIAL

## 2024-06-11 RX ORDER — AMPICILLIN 500 MG/1
500 CAPSULE ORAL EVERY 6 HOURS
Qty: 28 CAPSULE | Refills: 0 | Status: SHIPPED | OUTPATIENT
Start: 2024-06-11 | End: 2024-06-12 | Stop reason: SDUPTHER

## 2024-06-11 NOTE — TELEPHONE ENCOUNTER
Spoke with pt.  Repeat urine culture with lactobacillus.  Will go ahead and treat with amp.  R/b/a reviewed and all ques answered.  She is in agreement.  Rx sent

## 2024-06-12 ENCOUNTER — TELEPHONE (OUTPATIENT)
Dept: OBSTETRICS AND GYNECOLOGY | Facility: CLINIC | Age: 34
End: 2024-06-12
Payer: COMMERCIAL

## 2024-06-12 RX ORDER — AMPICILLIN 500 MG/1
500 CAPSULE ORAL EVERY 6 HOURS
Qty: 28 CAPSULE | Refills: 0 | Status: SHIPPED | OUTPATIENT
Start: 2024-06-12 | End: 2024-06-19

## 2024-06-12 NOTE — TELEPHONE ENCOUNTER
OB pt not able to  abx for UTI until Friday, currently out of town.      Can you sign so she can pick it up before Friday?

## 2024-06-27 ENCOUNTER — ROUTINE PRENATAL (OUTPATIENT)
Dept: OBSTETRICS AND GYNECOLOGY | Facility: CLINIC | Age: 34
End: 2024-06-27
Attending: STUDENT IN AN ORGANIZED HEALTH CARE EDUCATION/TRAINING PROGRAM
Payer: COMMERCIAL

## 2024-06-27 VITALS
DIASTOLIC BLOOD PRESSURE: 70 MMHG | WEIGHT: 152.69 LBS | SYSTOLIC BLOOD PRESSURE: 108 MMHG | BODY MASS INDEX: 26.21 KG/M2

## 2024-06-27 DIAGNOSIS — Z3A.09 9 WEEKS GESTATION OF PREGNANCY: Primary | ICD-10-CM

## 2024-06-27 PROCEDURE — 99999 PR PBB SHADOW E&M-EST. PATIENT-LVL II: CPT | Mod: PBBFAC,,, | Performed by: STUDENT IN AN ORGANIZED HEALTH CARE EDUCATION/TRAINING PROGRAM

## 2024-06-27 RX ORDER — FOLIC ACID 0.8 MG
800 TABLET ORAL DAILY
COMMUNITY

## 2024-06-27 NOTE — PROGRESS NOTES
Doing well.  No complaints.  + FHTs on v scan.  Labs and [id number].  Scheduled for appts.  RTC in 4 weeks or sooner if needed.

## 2024-06-28 ENCOUNTER — LAB VISIT (OUTPATIENT)
Dept: LAB | Facility: OTHER | Age: 34
End: 2024-06-28
Attending: STUDENT IN AN ORGANIZED HEALTH CARE EDUCATION/TRAINING PROGRAM
Payer: COMMERCIAL

## 2024-06-28 DIAGNOSIS — Z3A.01 6 WEEKS GESTATION OF PREGNANCY: ICD-10-CM

## 2024-06-28 LAB
ABO + RH BLD: NORMAL
ALBUMIN SERPL BCP-MCNC: 3.7 G/DL (ref 3.5–5.2)
ALP SERPL-CCNC: 46 U/L (ref 55–135)
ALT SERPL W/O P-5'-P-CCNC: 13 U/L (ref 10–44)
ANION GAP SERPL CALC-SCNC: 7 MMOL/L (ref 8–16)
AST SERPL-CCNC: 15 U/L (ref 10–40)
BASOPHILS # BLD AUTO: 0.03 K/UL (ref 0–0.2)
BASOPHILS NFR BLD: 0.3 % (ref 0–1.9)
BILIRUB SERPL-MCNC: 0.3 MG/DL (ref 0.1–1)
BLD GP AB SCN CELLS X3 SERPL QL: NORMAL
BUN SERPL-MCNC: 9 MG/DL (ref 6–20)
CALCIUM SERPL-MCNC: 8.8 MG/DL (ref 8.7–10.5)
CHLORIDE SERPL-SCNC: 107 MMOL/L (ref 95–110)
CO2 SERPL-SCNC: 21 MMOL/L (ref 23–29)
CREAT SERPL-MCNC: 0.7 MG/DL (ref 0.5–1.4)
DIFFERENTIAL METHOD BLD: ABNORMAL
EOSINOPHIL # BLD AUTO: 0.1 K/UL (ref 0–0.5)
EOSINOPHIL NFR BLD: 0.6 % (ref 0–8)
ERYTHROCYTE [DISTWIDTH] IN BLOOD BY AUTOMATED COUNT: 12.4 % (ref 11.5–14.5)
EST. GFR  (NO RACE VARIABLE): >60 ML/MIN/1.73 M^2
GLUCOSE SERPL-MCNC: 109 MG/DL (ref 70–110)
HBV SURFACE AG SERPL QL IA: NORMAL
HCT VFR BLD AUTO: 38.4 % (ref 37–48.5)
HCV AB SERPL QL IA: NEGATIVE
HGB BLD-MCNC: 13 G/DL (ref 12–16)
HIV 1+2 AB+HIV1 P24 AG SERPL QL IA: NEGATIVE
IMM GRANULOCYTES # BLD AUTO: 0.06 K/UL (ref 0–0.04)
IMM GRANULOCYTES NFR BLD AUTO: 0.6 % (ref 0–0.5)
LYMPHOCYTES # BLD AUTO: 1.8 K/UL (ref 1–4.8)
LYMPHOCYTES NFR BLD: 18.6 % (ref 18–48)
MCH RBC QN AUTO: 30.8 PG (ref 27–31)
MCHC RBC AUTO-ENTMCNC: 33.9 G/DL (ref 32–36)
MCV RBC AUTO: 91 FL (ref 82–98)
MONOCYTES # BLD AUTO: 0.4 K/UL (ref 0.3–1)
MONOCYTES NFR BLD: 4.7 % (ref 4–15)
NEUTROPHILS # BLD AUTO: 7.1 K/UL (ref 1.8–7.7)
NEUTROPHILS NFR BLD: 75.2 % (ref 38–73)
NRBC BLD-RTO: 0 /100 WBC
PLATELET # BLD AUTO: 256 K/UL (ref 150–450)
PMV BLD AUTO: 9.2 FL (ref 9.2–12.9)
POTASSIUM SERPL-SCNC: 3.9 MMOL/L (ref 3.5–5.1)
PROT SERPL-MCNC: 6.8 G/DL (ref 6–8.4)
RBC # BLD AUTO: 4.22 M/UL (ref 4–5.4)
SODIUM SERPL-SCNC: 135 MMOL/L (ref 136–145)
SPECIMEN OUTDATE: NORMAL
TREPONEMA PALLIDUM IGG+IGM AB [PRESENCE] IN SERUM OR PLASMA BY IMMUNOASSAY: NONREACTIVE
TSH SERPL DL<=0.005 MIU/L-ACNC: 3.26 UIU/ML (ref 0.4–4)
WBC # BLD AUTO: 9.39 K/UL (ref 3.9–12.7)

## 2024-06-28 PROCEDURE — 86901 BLOOD TYPING SEROLOGIC RH(D): CPT | Performed by: STUDENT IN AN ORGANIZED HEALTH CARE EDUCATION/TRAINING PROGRAM

## 2024-06-28 PROCEDURE — 84443 ASSAY THYROID STIM HORMONE: CPT | Performed by: STUDENT IN AN ORGANIZED HEALTH CARE EDUCATION/TRAINING PROGRAM

## 2024-06-28 PROCEDURE — 86593 SYPHILIS TEST NON-TREP QUANT: CPT | Performed by: STUDENT IN AN ORGANIZED HEALTH CARE EDUCATION/TRAINING PROGRAM

## 2024-06-28 PROCEDURE — 86762 RUBELLA ANTIBODY: CPT | Performed by: STUDENT IN AN ORGANIZED HEALTH CARE EDUCATION/TRAINING PROGRAM

## 2024-06-28 PROCEDURE — 86850 RBC ANTIBODY SCREEN: CPT | Performed by: STUDENT IN AN ORGANIZED HEALTH CARE EDUCATION/TRAINING PROGRAM

## 2024-06-28 PROCEDURE — 36415 COLL VENOUS BLD VENIPUNCTURE: CPT | Performed by: STUDENT IN AN ORGANIZED HEALTH CARE EDUCATION/TRAINING PROGRAM

## 2024-06-28 PROCEDURE — 87389 HIV-1 AG W/HIV-1&-2 AB AG IA: CPT | Performed by: STUDENT IN AN ORGANIZED HEALTH CARE EDUCATION/TRAINING PROGRAM

## 2024-06-28 PROCEDURE — 86803 HEPATITIS C AB TEST: CPT | Performed by: STUDENT IN AN ORGANIZED HEALTH CARE EDUCATION/TRAINING PROGRAM

## 2024-06-28 PROCEDURE — 85025 COMPLETE CBC W/AUTO DIFF WBC: CPT | Performed by: STUDENT IN AN ORGANIZED HEALTH CARE EDUCATION/TRAINING PROGRAM

## 2024-06-28 PROCEDURE — 80053 COMPREHEN METABOLIC PANEL: CPT | Performed by: STUDENT IN AN ORGANIZED HEALTH CARE EDUCATION/TRAINING PROGRAM

## 2024-06-28 PROCEDURE — 87340 HEPATITIS B SURFACE AG IA: CPT | Performed by: STUDENT IN AN ORGANIZED HEALTH CARE EDUCATION/TRAINING PROGRAM

## 2024-07-01 LAB
RUBV IGG SER-ACNC: 16.8 IU/ML
RUBV IGG SER-IMP: REACTIVE

## 2024-07-05 ENCOUNTER — TELEPHONE (OUTPATIENT)
Dept: OBSTETRICS AND GYNECOLOGY | Facility: CLINIC | Age: 34
End: 2024-07-05
Payer: COMMERCIAL

## 2024-07-05 ENCOUNTER — PATIENT MESSAGE (OUTPATIENT)
Dept: OBSTETRICS AND GYNECOLOGY | Facility: CLINIC | Age: 34
End: 2024-07-05
Payer: COMMERCIAL

## 2024-07-07 ENCOUNTER — PATIENT MESSAGE (OUTPATIENT)
Dept: OBSTETRICS AND GYNECOLOGY | Facility: CLINIC | Age: 34
End: 2024-07-07
Payer: COMMERCIAL

## 2024-07-08 NOTE — TELEPHONE ENCOUNTER
Okay to go to chemo with her mom. Make sure the RN administering is aware she is pregnant. Recommend avoiding the actual tubing that the chemo is being administered through.

## 2024-07-09 ENCOUNTER — PATIENT MESSAGE (OUTPATIENT)
Dept: OBSTETRICS AND GYNECOLOGY | Facility: CLINIC | Age: 34
End: 2024-07-09
Payer: COMMERCIAL

## 2024-07-25 ENCOUNTER — ROUTINE PRENATAL (OUTPATIENT)
Dept: OBSTETRICS AND GYNECOLOGY | Facility: CLINIC | Age: 34
End: 2024-07-25
Attending: STUDENT IN AN ORGANIZED HEALTH CARE EDUCATION/TRAINING PROGRAM
Payer: COMMERCIAL

## 2024-07-25 VITALS
BODY MASS INDEX: 26.66 KG/M2 | SYSTOLIC BLOOD PRESSURE: 110 MMHG | DIASTOLIC BLOOD PRESSURE: 70 MMHG | WEIGHT: 155.31 LBS

## 2024-07-25 DIAGNOSIS — Z3A.13 13 WEEKS GESTATION OF PREGNANCY: ICD-10-CM

## 2024-07-25 DIAGNOSIS — Z34.02 ENCOUNTER FOR SUPERVISION OF NORMAL FIRST PREGNANCY IN SECOND TRIMESTER: Primary | ICD-10-CM

## 2024-07-25 PROCEDURE — 0502F SUBSEQUENT PRENATAL CARE: CPT | Mod: CPTII,S$GLB,, | Performed by: STUDENT IN AN ORGANIZED HEALTH CARE EDUCATION/TRAINING PROGRAM

## 2024-07-25 PROCEDURE — 99999 PR PBB SHADOW E&M-EST. PATIENT-LVL III: CPT | Mod: PBBFAC,,, | Performed by: STUDENT IN AN ORGANIZED HEALTH CARE EDUCATION/TRAINING PROGRAM

## 2024-08-08 ENCOUNTER — PATIENT MESSAGE (OUTPATIENT)
Dept: OBSTETRICS AND GYNECOLOGY | Facility: CLINIC | Age: 34
End: 2024-08-08
Payer: COMMERCIAL

## 2024-08-08 DIAGNOSIS — R51.9 NONINTRACTABLE EPISODIC HEADACHE, UNSPECIFIED HEADACHE TYPE: Primary | ICD-10-CM

## 2024-08-09 ENCOUNTER — PATIENT MESSAGE (OUTPATIENT)
Dept: OTHER | Facility: OTHER | Age: 34
End: 2024-08-09
Payer: COMMERCIAL

## 2024-08-09 RX ORDER — BUTALBITAL, ACETAMINOPHEN AND CAFFEINE 50; 325; 40 MG/1; MG/1; MG/1
1 TABLET ORAL EVERY 6 HOURS PRN
Qty: 30 TABLET | Refills: 0 | Status: SHIPPED | OUTPATIENT
Start: 2024-08-09 | End: 2024-09-08

## 2024-08-16 ENCOUNTER — PATIENT MESSAGE (OUTPATIENT)
Dept: ADMINISTRATIVE | Facility: OTHER | Age: 34
End: 2024-08-16
Payer: COMMERCIAL

## 2024-08-16 ENCOUNTER — PATIENT MESSAGE (OUTPATIENT)
Dept: OTHER | Facility: OTHER | Age: 34
End: 2024-08-16
Payer: COMMERCIAL

## 2024-08-16 ENCOUNTER — ROUTINE PRENATAL (OUTPATIENT)
Dept: OBSTETRICS AND GYNECOLOGY | Facility: CLINIC | Age: 34
End: 2024-08-16
Attending: STUDENT IN AN ORGANIZED HEALTH CARE EDUCATION/TRAINING PROGRAM
Payer: COMMERCIAL

## 2024-08-16 VITALS — BODY MASS INDEX: 26.6 KG/M2 | SYSTOLIC BLOOD PRESSURE: 112 MMHG | DIASTOLIC BLOOD PRESSURE: 62 MMHG | WEIGHT: 155 LBS

## 2024-08-16 DIAGNOSIS — Z3A.17 17 WEEKS GESTATION OF PREGNANCY: ICD-10-CM

## 2024-08-16 DIAGNOSIS — Z34.02 ENCOUNTER FOR SUPERVISION OF NORMAL FIRST PREGNANCY IN SECOND TRIMESTER: Primary | ICD-10-CM

## 2024-08-16 PROCEDURE — 99999 PR PBB SHADOW E&M-EST. PATIENT-LVL III: CPT | Mod: PBBFAC,,, | Performed by: STUDENT IN AN ORGANIZED HEALTH CARE EDUCATION/TRAINING PROGRAM

## 2024-08-16 NOTE — PROGRESS NOTES
17w0d  Doing well today without any major complaints. Having nerve pain in right big toe - improving. Headaches have improved with daily Claritin - thinks was sinus related.  Denies abdominal pain, vaginal bleeding, discharge, leakage of fluid.  Anatomy scan scheduled. Connected MOM signed today. ENMANUEL precautions. RTC 4 wks.

## 2024-08-18 ENCOUNTER — PATIENT MESSAGE (OUTPATIENT)
Dept: OBSTETRICS AND GYNECOLOGY | Facility: CLINIC | Age: 34
End: 2024-08-18
Payer: COMMERCIAL

## 2024-09-04 ENCOUNTER — PROCEDURE VISIT (OUTPATIENT)
Dept: MATERNAL FETAL MEDICINE | Facility: CLINIC | Age: 34
End: 2024-09-04
Attending: STUDENT IN AN ORGANIZED HEALTH CARE EDUCATION/TRAINING PROGRAM
Payer: COMMERCIAL

## 2024-09-04 DIAGNOSIS — Z3A.01 6 WEEKS GESTATION OF PREGNANCY: ICD-10-CM

## 2024-09-04 PROCEDURE — 76811 OB US DETAILED SNGL FETUS: CPT | Mod: S$GLB,,, | Performed by: OBSTETRICS & GYNECOLOGY

## 2024-09-06 ENCOUNTER — PATIENT MESSAGE (OUTPATIENT)
Dept: OTHER | Facility: OTHER | Age: 34
End: 2024-09-06
Payer: COMMERCIAL

## 2024-09-19 ENCOUNTER — ROUTINE PRENATAL (OUTPATIENT)
Dept: OBSTETRICS AND GYNECOLOGY | Facility: CLINIC | Age: 34
End: 2024-09-19
Attending: STUDENT IN AN ORGANIZED HEALTH CARE EDUCATION/TRAINING PROGRAM
Payer: COMMERCIAL

## 2024-09-19 ENCOUNTER — TELEPHONE (OUTPATIENT)
Dept: OBSTETRICS AND GYNECOLOGY | Facility: CLINIC | Age: 34
End: 2024-09-19

## 2024-09-19 VITALS
WEIGHT: 162.06 LBS | SYSTOLIC BLOOD PRESSURE: 120 MMHG | BODY MASS INDEX: 27.81 KG/M2 | DIASTOLIC BLOOD PRESSURE: 70 MMHG

## 2024-09-19 DIAGNOSIS — Z3A.21 21 WEEKS GESTATION OF PREGNANCY: ICD-10-CM

## 2024-09-19 DIAGNOSIS — Z34.02 ENCOUNTER FOR SUPERVISION OF NORMAL FIRST PREGNANCY IN SECOND TRIMESTER: Primary | ICD-10-CM

## 2024-09-19 DIAGNOSIS — O44.40 LOW-LYING PLACENTA: Primary | ICD-10-CM

## 2024-09-19 DIAGNOSIS — Z23 NEED FOR VACCINATION: ICD-10-CM

## 2024-09-19 PROCEDURE — 99999 PR PBB SHADOW E&M-EST. PATIENT-LVL III: CPT | Mod: PBBFAC,,, | Performed by: STUDENT IN AN ORGANIZED HEALTH CARE EDUCATION/TRAINING PROGRAM

## 2024-09-19 RX ORDER — ASPIRIN 81 MG/1
81 TABLET ORAL DAILY
COMMUNITY

## 2024-09-19 NOTE — PROGRESS NOTES
21w6d  Doing well today. C/o right sided hip pain, thinks muscular, advised heating pad/warm bath. Also having left upper thigh pain/numbness with certain positions, she will try hip/leg stretches. No additional complaints.  Excellent FM. Denies vaginal bleeding, discharge, contractions, LOF.  Flu vaccine today. 1 hr GTT/CBC next visit. Will message MFM for third trimester placenta re-assessment.  Labor, preE, [facility] precautions provided. RTC 4 wk or earlier if needed. Dr Campos pt.

## 2024-09-19 NOTE — TELEPHONE ENCOUNTER
Hello, this pt has a low lying placenta and per Dr. Middleton will need another US at 28 weeks to evaluate again.  Will you reach out to pt to schedule this appt?  Thank you!

## 2024-09-20 ENCOUNTER — PATIENT MESSAGE (OUTPATIENT)
Dept: MATERNAL FETAL MEDICINE | Facility: CLINIC | Age: 34
End: 2024-09-20
Payer: COMMERCIAL

## 2024-09-23 NOTE — PROGRESS NOTES
13w6d  Doing well today with no major complaints.  Excellent FM. Denies vaginal bleeding, discharge, contractions, LOF.  Anatomy scan scheduled. Questions answered.  Labor, preE, [facility] precautions provided. RTC 4 wk or earlier if needed. Dr Campos pt.

## 2024-09-27 ENCOUNTER — PATIENT MESSAGE (OUTPATIENT)
Dept: MATERNAL FETAL MEDICINE | Facility: CLINIC | Age: 34
End: 2024-09-27
Payer: COMMERCIAL

## 2024-10-04 ENCOUNTER — PATIENT MESSAGE (OUTPATIENT)
Dept: OTHER | Facility: OTHER | Age: 34
End: 2024-10-04
Payer: COMMERCIAL

## 2024-10-06 ENCOUNTER — PATIENT MESSAGE (OUTPATIENT)
Dept: OBSTETRICS AND GYNECOLOGY | Facility: CLINIC | Age: 34
End: 2024-10-06
Payer: COMMERCIAL

## 2024-10-11 ENCOUNTER — PATIENT MESSAGE (OUTPATIENT)
Dept: OBSTETRICS AND GYNECOLOGY | Facility: CLINIC | Age: 34
End: 2024-10-11
Payer: COMMERCIAL

## 2024-10-15 ENCOUNTER — PATIENT MESSAGE (OUTPATIENT)
Dept: OBSTETRICS AND GYNECOLOGY | Facility: CLINIC | Age: 34
End: 2024-10-15
Payer: COMMERCIAL

## 2024-10-17 ENCOUNTER — LAB VISIT (OUTPATIENT)
Dept: LAB | Facility: OTHER | Age: 34
End: 2024-10-17
Attending: STUDENT IN AN ORGANIZED HEALTH CARE EDUCATION/TRAINING PROGRAM
Payer: COMMERCIAL

## 2024-10-17 ENCOUNTER — ROUTINE PRENATAL (OUTPATIENT)
Dept: OBSTETRICS AND GYNECOLOGY | Facility: CLINIC | Age: 34
End: 2024-10-17
Attending: STUDENT IN AN ORGANIZED HEALTH CARE EDUCATION/TRAINING PROGRAM
Payer: COMMERCIAL

## 2024-10-17 VITALS
BODY MASS INDEX: 28.36 KG/M2 | SYSTOLIC BLOOD PRESSURE: 116 MMHG | DIASTOLIC BLOOD PRESSURE: 70 MMHG | WEIGHT: 165.25 LBS

## 2024-10-17 DIAGNOSIS — Z3A.25 25 WEEKS GESTATION OF PREGNANCY: Primary | ICD-10-CM

## 2024-10-17 DIAGNOSIS — Z3A.21 21 WEEKS GESTATION OF PREGNANCY: ICD-10-CM

## 2024-10-17 LAB
BASOPHILS # BLD AUTO: 0.03 K/UL (ref 0–0.2)
BASOPHILS NFR BLD: 0.3 % (ref 0–1.9)
DIFFERENTIAL METHOD BLD: ABNORMAL
EOSINOPHIL # BLD AUTO: 0.1 K/UL (ref 0–0.5)
EOSINOPHIL NFR BLD: 0.5 % (ref 0–8)
ERYTHROCYTE [DISTWIDTH] IN BLOOD BY AUTOMATED COUNT: 13.2 % (ref 11.5–14.5)
GLUCOSE SERPL-MCNC: 110 MG/DL (ref 70–140)
HCT VFR BLD AUTO: 34.5 % (ref 37–48.5)
HGB BLD-MCNC: 11.4 G/DL (ref 12–16)
IMM GRANULOCYTES # BLD AUTO: 0.09 K/UL (ref 0–0.04)
IMM GRANULOCYTES NFR BLD AUTO: 0.8 % (ref 0–0.5)
LYMPHOCYTES # BLD AUTO: 1.5 K/UL (ref 1–4.8)
LYMPHOCYTES NFR BLD: 12.9 % (ref 18–48)
MCH RBC QN AUTO: 31.8 PG (ref 27–31)
MCHC RBC AUTO-ENTMCNC: 33 G/DL (ref 32–36)
MCV RBC AUTO: 96 FL (ref 82–98)
MONOCYTES # BLD AUTO: 0.6 K/UL (ref 0.3–1)
MONOCYTES NFR BLD: 5.6 % (ref 4–15)
NEUTROPHILS # BLD AUTO: 9.2 K/UL (ref 1.8–7.7)
NEUTROPHILS NFR BLD: 79.9 % (ref 38–73)
NRBC BLD-RTO: 0 /100 WBC
PLATELET # BLD AUTO: 237 K/UL (ref 150–450)
PMV BLD AUTO: 9.3 FL (ref 9.2–12.9)
RBC # BLD AUTO: 3.59 M/UL (ref 4–5.4)
WBC # BLD AUTO: 11.49 K/UL (ref 3.9–12.7)

## 2024-10-17 PROCEDURE — 99999 PR PBB SHADOW E&M-EST. PATIENT-LVL III: CPT | Mod: PBBFAC,,, | Performed by: STUDENT IN AN ORGANIZED HEALTH CARE EDUCATION/TRAINING PROGRAM

## 2024-10-17 PROCEDURE — 36415 COLL VENOUS BLD VENIPUNCTURE: CPT | Performed by: STUDENT IN AN ORGANIZED HEALTH CARE EDUCATION/TRAINING PROGRAM

## 2024-10-17 PROCEDURE — 0352U VAGINOSIS SCREEN BY DNA PROBE: CPT | Performed by: STUDENT IN AN ORGANIZED HEALTH CARE EDUCATION/TRAINING PROGRAM

## 2024-10-17 PROCEDURE — 82950 GLUCOSE TEST: CPT | Performed by: STUDENT IN AN ORGANIZED HEALTH CARE EDUCATION/TRAINING PROGRAM

## 2024-10-17 PROCEDURE — 85025 COMPLETE CBC W/AUTO DIFF WBC: CPT | Performed by: STUDENT IN AN ORGANIZED HEALTH CARE EDUCATION/TRAINING PROGRAM

## 2024-10-17 NOTE — PROGRESS NOTES
Doing well.  Some increased discharge and odor.  No vb.  Reports fetal movement.  Denies contractions, leakage of fluid, vaginal bleeding.  Glucose and cbc today.  Sse with physiologic discharge, cervix vis closed.  Swab sent.  All questions answered.  RTC in 4 weeks or sooner if needed.

## 2024-10-18 ENCOUNTER — PATIENT MESSAGE (OUTPATIENT)
Dept: OTHER | Facility: OTHER | Age: 34
End: 2024-10-18
Payer: COMMERCIAL

## 2024-10-19 LAB
BACTERIAL VAGINOSIS DNA: NOT DETECTED
CANDIDA GLABRATA/KRUSEI: NOT DETECTED
CANDIDA RRNA VAG QL PROBE: NOT DETECTED
TRICHOMONAS VAGINALIS: NOT DETECTED

## 2024-11-01 ENCOUNTER — PATIENT MESSAGE (OUTPATIENT)
Dept: OTHER | Facility: OTHER | Age: 34
End: 2024-11-01
Payer: COMMERCIAL

## 2024-11-02 ENCOUNTER — PATIENT MESSAGE (OUTPATIENT)
Dept: OBSTETRICS AND GYNECOLOGY | Facility: CLINIC | Age: 34
End: 2024-11-02
Payer: COMMERCIAL

## 2024-11-04 ENCOUNTER — PROCEDURE VISIT (OUTPATIENT)
Dept: MATERNAL FETAL MEDICINE | Facility: CLINIC | Age: 34
End: 2024-11-04
Attending: STUDENT IN AN ORGANIZED HEALTH CARE EDUCATION/TRAINING PROGRAM
Payer: COMMERCIAL

## 2024-11-04 DIAGNOSIS — O44.40 LOW-LYING PLACENTA: ICD-10-CM

## 2024-11-04 PROCEDURE — 76816 OB US FOLLOW-UP PER FETUS: CPT | Mod: S$GLB,,, | Performed by: OBSTETRICS & GYNECOLOGY

## 2024-11-04 PROCEDURE — 76817 TRANSVAGINAL US OBSTETRIC: CPT | Mod: S$GLB,,, | Performed by: OBSTETRICS & GYNECOLOGY

## 2024-11-14 ENCOUNTER — ROUTINE PRENATAL (OUTPATIENT)
Dept: OBSTETRICS AND GYNECOLOGY | Facility: CLINIC | Age: 34
End: 2024-11-14
Attending: STUDENT IN AN ORGANIZED HEALTH CARE EDUCATION/TRAINING PROGRAM
Payer: COMMERCIAL

## 2024-11-14 ENCOUNTER — CLINICAL SUPPORT (OUTPATIENT)
Dept: OBSTETRICS AND GYNECOLOGY | Facility: CLINIC | Age: 34
End: 2024-11-14
Payer: COMMERCIAL

## 2024-11-14 VITALS — WEIGHT: 167 LBS | DIASTOLIC BLOOD PRESSURE: 70 MMHG | SYSTOLIC BLOOD PRESSURE: 116 MMHG | BODY MASS INDEX: 28.67 KG/M2

## 2024-11-14 DIAGNOSIS — Z23 NEED FOR TDAP VACCINATION: Primary | ICD-10-CM

## 2024-11-14 DIAGNOSIS — Z3A.29 29 WEEKS GESTATION OF PREGNANCY: Primary | ICD-10-CM

## 2024-11-14 PROCEDURE — 99999 PR PBB SHADOW E&M-EST. PATIENT-LVL III: CPT | Mod: PBBFAC,,, | Performed by: STUDENT IN AN ORGANIZED HEALTH CARE EDUCATION/TRAINING PROGRAM

## 2024-11-14 PROCEDURE — 99999 PR PBB SHADOW E&M-EST. PATIENT-LVL I: CPT | Mod: PBBFAC,,,

## 2024-11-14 RX ORDER — FAMOTIDINE 10 MG/1
10 TABLET ORAL 2 TIMES DAILY
COMMUNITY

## 2024-11-14 NOTE — PROGRESS NOTES
Doing well.  Volodymyr reflux - reviewed same meds in pregnancy.  Also some hip pain.  Will set up with belt and PT.  She is in agreement.   Reports fetal movement.  Denies contractions, leakage of fluid, vaginal bleeding.  Had u/s and low lying placenta resolved.  TDAP done today - reviewed indications and discussed./  r/b/a./  All questions answered.  RTC in 2 weeks or sooner if needed.    
11 month old M well-appearing, well-hydrated with cough, congestion, rhinorrhea and fever since yesterday, now with stridor with crying, likely viral croup. Lungs clear, no resp distress, abd benign. Decadron, Motrin given. D/C home with supportive care, antipyretics, anticipatory guidance, and follow up PMD.  Return for worsening or persistent symptoms.

## 2024-11-14 NOTE — PROGRESS NOTES
Ordering Provider: Dr. Garnett    Pt received Tdap to RIGHT deltoid.  Pt tolerated injection well.  Pt was instructed to wait 15 minutes to monitor for signs and symptoms of any reactions.  Pt has no further questions, comments, or concerns at this time.

## 2024-11-15 ENCOUNTER — PATIENT MESSAGE (OUTPATIENT)
Dept: OTHER | Facility: OTHER | Age: 34
End: 2024-11-15
Payer: COMMERCIAL

## 2024-11-17 ENCOUNTER — PATIENT MESSAGE (OUTPATIENT)
Dept: REHABILITATION | Facility: HOSPITAL | Age: 34
End: 2024-11-17
Payer: COMMERCIAL

## 2024-11-17 NOTE — PLAN OF CARE
"[facility]   Pelvic Health Physical Therapy Initial Evaluation      Name: Leanna Yee  [facility] Number: [id number]    Therapy Diagnosis:   Encounter Diagnoses   Name Primary?    29 weeks gestation of pregnancy     Hip weakness Yes    Pregnancy related back pain, antepartum      Physician: Anastasia Garnett, *    Referring Provider Orders: PT Eval and Treat  Medical Diagnosis from Referral: 29 weeks gestation of pregnancy [Z3A.29]   Evaluation Date: 2024  Authorization Period Expiration: 2024  Plan of Care Expiration: 2025  Progress Note Due: 2024  Visit # / Visits authorized: 1/pending  FOTO: 1 (2024)    Precautions: standard    Time In: 10:02 am  Time Out: 10:49 am   Total Appointment Time (timed & untimed codes): 47 minutes    SUBJECTIVE     Date of onset: a few months   History of current condition: Leanna reports she is 30 weeks pregnant. States she was having severe round ligament pain a few weeks ago. States she is currently experiencing some round ligament pain but it is not as bad as before. She also has some lower back/SI joint pain that comes and goes. Reports her round ligament pain often hinders her from walking as far as she would like. Reports she feels like she has a hard time getting in and out of bed. She is interesting in going to [place] or Pilates, but wants to make sure that this type of exercise is safe. States her hips are really tight and she wants to learn how to stretch them. States she is planning for birth at [facility] with an epidural, she is not interested in any other pain medications.     OB/GYN HISTORY -     BLADDER HISTORY  Frequency of urination:   Day: every 2-3 hours           Night: 1-2x/night  Difficulty initiating urine stream: No  Hover over toilet seats: No  Do you use the bathroom "just in case"? Sometimes  Urine stream: strong  Complete emptying: Yes  Post-void dribbling: No  Urinary Urgency: No " "Able to delay the urge for at least 30 minute(s).  Bladder leakage: Sometimes  Activities that cause leakage: sneezing  Frequency of incidents: every once in a while  Amount leaked (urine): few drops  Form of protection: none    BOWEL HISTORY  Frequency of bowel movements: once a day  Difficulty initiating BM: No  Incomplete emptying? No  Bowel Urgency: No.    Colon leakage: No    SEXUAL/PELVIC PAIN HISTORY  Sexually active? Yes   Pain with vaginal exams, intercourse or tampon use? No  Tailbone injury? No   Feeling of pelvic heaviness? No    Pain:  Location: round ligament, hip pain   Description: Dull, Deep, and Sharp  Aggravating Factors/Activities that cause symptoms: changing positions, walking far distances   Easing Factors: relaxation and rest    Imaging: none pertinent to the pelvic floor    Prior Therapy: no prior pelvic floor therapy  Social History: lives with their spouse  Current exercise: 3 miles   Occupation: works from home   Prior Level of Function: no pelvic floor dysfunction  Current Level of Function: low back/SI joint pain, round ligament pain, and hip tightness    Types of fluid intake: "a lot of water"  Diet: standard  Habitus: well developed, well nourished  Abuse/Neglect: Pt denies a history of physical or emotional abuse at this visit.        Patients goals: relieve round ligament pain, make sure she is prepared for birth      Medical History: Leanna  has no past medical history on file.     Surgical History: Leanna Yee  has a past surgical history that includes Appendectomy.    Medications: Leanna has a current medication list which includes the following prescription(s): aspirin, clindamycin-benzoyl peroxide, famotidine, folic acid, loratadine, and prenatal 25/iron/folate 6/dha.    Allergies:   Review of patient's allergies indicates:   Allergen Reactions    Prochlorperazine Other (See Comments)     Lost control of neck and eye muscles        Constitutional Symptoms Review: " The patient denies having any constitutional symptoms.     Flags Screening  Red Flags:The patient was screened for red flags and none were identified.      OBJECTIVE     ORTHO SCREEN  Posture in sitting: knees wide open   Posture in standing: forward head and increased lordosis  Pelvic alignment: no sign of deviations noted in supine     Hip Strength 11/20/2024   R hip flexion 4-/5   R hip external rotation 4-/5   R hip internal rotation 3+/5   L hip flexion 4-/5   L hip external rotation 3+/5   L hip internal rotation 3/5     Hip Range of Motion 11/20/2024   R internal rotation WNL   L internal rotation WNL     ABDOMINAL WALL ASSESSMENT  Palpation: thickened   Scarring: none present  Diastasis Recti: absent, 0-finger width, present with supine curl-up task  Motor Control: able to demonstrate appropriate transverse abdominis contraction on command  Pelvic Girdle Stability: Pt demonstrates moderately impaired ability to stabilize pelvis with Active Straight Leg Raise Test. Able to improve markedly with verbal/manual cues for transverse abdominus activation.     BREATHING MECHANICS ASSESSMENT   Thorax Assessment During Quiet Respiration: WNL excursion of ribcage and WNL excursion of abdominal wall  Thorax Assessment During Deep Respiration: Excessive excursion of sternum    Limitation/Restriction for FOTO Pelvic Floor Surveys    Therapist reviewed FOTO scores for Leanna Yee on 11/20/2024  FOTO documents entered into [employer] - see Media section.    Limitation Score:        TREATMENT     Total Treatment time (time-based codes) separate from the evaluation: 23 minutes     Manual Therapy to develop flexibility and extensibility for 9 minutes including:   [x] Grade II-III sustained mobilization/distraction to L and R hip     Therapeutic Activity Patient participated in dynamic functional therapeutic activities to improve functional performance for 14 minutes. Including: Education as described below.   [x] 2x10  transverse abdominis bracing   [x] 2x10 side-lying clams with focus on pelvic girdle stability   [x] 1 min quadruped cat-cow   [x] 2x30 second lunge hip flexor stretch with upper body supported on big ball  [x] 1x8 seated pelvic clock on big ball  [x] Instruction on coordination drills  [x] Guidance and practice applying SI belt   [x] Instruction on the Knack for reduction of stress urinary incontinence  [x] Education on relationship between TrA, hips, and PFM   [x] Education on hormonal changes during pregnancy   [x] Education on log-roll transfer technique  [x] Education on anatomy of round ligament   [x] HEP building/HEP review  [x] Discussion of future plan of care and birth prep     PATIENT EDUCATION AND HOME EXERCISES     Education provided: general anatomy/physiology of urinary & bowel system, benefits of treatment, and alternative methods of treatment were discussed with the patient. Additionally, Leanna was provided education on pt prognosis, PT plan of care, relationship between TrA & PFM, relationship between hips & PFM, hormonal changes during pregnancy/nursing & impact on ligamentous laxity, the knack for management of stress urinary incontinence, hip-opening stretches for pelvic floor relaxation, log-roll transfer technique, etiology & PT management of diastasis recti, round ligament anatomy, coordination drills with focus on pelvic floor relaxation.     Written Home Exercises provided: yes  Exercises were reviewed, and Leanna was able to demonstrate them prior to the end of the session. Leanna demonstrated good understanding of the education provided. See EMR under 'Patient Instructions' for exercises and education provided during session.    ASSESSMENT     Leanna is a 34 y.o. female referred to outpatient physical therapy with a medical diagnosis of 29 weeks gestation of pregnancy [Z3A.29]  and additional complaints of hip tightness, low back pain, and round ligament pain. Symptoms impair  the patient's ability to perform ADLs and functional mobility, as well as remain continent. Pt presents to therapy with pain during pregnancy which is affecting her ability to perform ADLs and participate in social activities. Examination reveals lumbopelvic dysfunction, muscular weakness, coordination deficits and pain with gait and transfers. She is expected to benefit from skilled intervention to work towards decreasing pain in order to improve ability to participate in ADLs and social activities.  Pt will benefit from pelvic floor muscle training, core/hip strengthening, patient education, manual therapy interventions, and functional retraining    Patient prognosis is good  Leanna will benefit from skilled outpatient physical therapy to address the deficits stated above and in the chart below, provide patient/family education, and to maximize the patient's level of independence.     Plan of care discussed with patient: yes  Patient's spiritual, cultural and educational needs considered, and the patient is agreeable to the plan of care and goals as stated below:     Anticipated Barriers for therapy: none     Medical Necessity is demonstrated by the following -  History  Co-morbidities and personal factors that may impact the plan of care [x] LOW: no personal factors / co-morbidities  [] MODERATE: 1-2 personal factors / co-morbidities  [] HIGH: 3+ personal factors / co-morbidities    Moderate / High Support Documentation:   Co-morbidities affecting plan of care: none    Personal Factors:   no deficits     Examination  Body Structures and Functions, activity limitations and participation restrictions that may impact the plan of care [x] LOW: addressing 1-2 elements  [] MODERATE: 3+ elements  [] HIGH: 4+ elements (please support below)    Moderate / High Support Documentation: none     Clinical Presentation [x] LOW: stable  [] MODERATE: Evolving  [] HIGH: Unstable     Decision Making/ Complexity Score: low        Goals:  Short Term Goals: 6 weeks   Pt to report 50% improvement in low back and round ligament pain   Patient to demonstrate good body mechanics with sit <--> stand and side lying <--> sitting transfers to demonstrate improved functional mobility.    Pt to demonstrate being able to correctly and consistently relax and lengthen her pelvic floor muscle(s) in preparation for labor and delivery.    Pt to demonstrate proper diaphragmatic breathing to help with shortness of breath and calming the nervous system.   Pt to demonstrate proper body mechanics with lifting, bending and squatting to decrease strain on lumbopelvic structures during pregnancy.    Pt to be independent with introductory home exercise program     Long Term Goals: 12 weeks  Pt to demonstrate bilateral hip strength of at least 4 to 4+/5 for improved pelvic girdle support with load transfer   Pt to be independent with SI belt application to reduce pain   Pt to report being able to sneeze without incontinence demonstrating improved pelvic floor strength and coordination to improve confidence in social situations.   Pt will be trained and compliant with postural strategies in sitting and standing to improve alignment and decrease pain and muscle fatigue.  Pt to score no more than 24% impairment on the PFDI Pain FOTO survey to demonstrate reduced impairment due to pelvic floor dysfunction   Pt to be independent with advanced home exercise program      PLAN     Plan of Care certification: 11/20/2024 to 2/20/2025    Outpatient Physical Therapy - 1-2 times per week for 12 weeks to include the following interventions: Therapeutic Exercise, Manual Therapy, Therapeutic Activity, Neuromuscular Re-education, Electrical Stimulation Unattended, Self-Care, Patient Education, Patient/Family Education, and Self Care/Home Management.       Penny Chapa, PT, DPT

## 2024-11-20 ENCOUNTER — CLINICAL SUPPORT (OUTPATIENT)
Dept: REHABILITATION | Facility: OTHER | Age: 34
End: 2024-11-20
Attending: STUDENT IN AN ORGANIZED HEALTH CARE EDUCATION/TRAINING PROGRAM
Payer: COMMERCIAL

## 2024-11-20 DIAGNOSIS — M54.9 PREGNANCY RELATED BACK PAIN, ANTEPARTUM: ICD-10-CM

## 2024-11-20 DIAGNOSIS — R29.898 HIP WEAKNESS: Primary | ICD-10-CM

## 2024-11-20 DIAGNOSIS — O26.899 PREGNANCY RELATED BACK PAIN, ANTEPARTUM: ICD-10-CM

## 2024-11-20 DIAGNOSIS — Z3A.29 29 WEEKS GESTATION OF PREGNANCY: ICD-10-CM

## 2024-11-20 PROCEDURE — 97140 MANUAL THERAPY 1/> REGIONS: CPT | Mod: PN

## 2024-11-20 PROCEDURE — 97530 THERAPEUTIC ACTIVITIES: CPT | Mod: PN

## 2024-11-20 PROCEDURE — 97161 PT EVAL LOW COMPLEX 20 MIN: CPT | Mod: PN

## 2024-11-20 NOTE — PATIENT INSTRUCTIONS
Reducing incontinence with the Knack  The Knack is a strong and well-timed contraction of the pelvic floor muscles performed immediately before and during an increase in downward pressure on the pelvic floor.     Bladder leaks can be controlled using this exercise, which helps to close the urine tube more effectively. When the pelvic floor muscles are working as they should, they contract automatically before and during an increase in pressure from within the abdomen, such as during a cough or sneeze. When the muscles are not working appropriately, this action is not automatic, but it can improve with practice.    The Knack can be used with events or activities that increase downward pressure upon your pelvic floor such as:    Coughing  Sneezing  Lifting  Blowing your nose  Rising into standing from sitting  Stepping down heavily    When you feel like you're about to cough/sneeze/lift...  Lift and squeeze the muscles in and around all three pelvic openings (urethra, vagina, and anus) immediately before  Maintain this pelvic floor muscle contraction as cough/sneeze/lift  Afterwards, relax your pelvic floor muscles back to normal resting level    If the pelvic floor is not very strong or has been working hard all day (lots of lifting, excessive coughing/sneezing while sick), then you may still experience some leakage. Just do your best, and it should improve as the pelvic floor gets stronger.    Simply remember to lift and squeeze with every cough, lift or sneeze!     Side-lying clam, 2-3 sets of 10  - Holding the hips level, lift the top leg a few inches.   *Hold the hips stacked on top of each other, not rolling back with movement  *Don't hold your breath  *Roll the top hip more forward than you think      Pelvic floor muscle coordination drills, 2 sets of 10 daily.   Gently squeeze the pelvic floor and then FULLY relax. Spend a few breaths focusing on relaxation before moving to the next contraction. Think  flaring the pelvic floor like a trumpet or feeling the pelvic floor drop.     Cat/cow  - Inhale as you stretch your belly down to the ground and lift the head up (picture 1)  - Exhale to engage the core as your round your back up towards the ceiling, let your head hang (picture 2)  *Don't hold your breath         Pelvic Clock 2 sets of 10     Sit on a birthing ball or chair with your feet flat on the floor. Bring your hands  to your hips so you can feel the movement in your pelvis as you stretch.  Now, imagine a clock resting on your pelvis--with your navel at 12 oclock and pubic bone at 6 oclock. Engage your abs and lengthen your spine. Inhale and tilt your pelvis toward a 3 oclock position.  Continue on the inhale and move around the clock, creating a small arch in your lower back. Exhale and bring your pelvis to 9 oclock. Continue your exhale until you reach a neutral 12 oclock position.     Hip Flexor Stretch 2 sets of 30 seconds each side     In all fours position with your arm resting on a chair or birthing ball, bring the right leg forward while extending/straightening the left leg back until you feel a stretch in the front of the left thigh. Hold  the position for 5-10 seconds. Repeat on the opposite side.     Supine transverse abdominus (TrA) brace  - Inhale to prepare, relax the belly and pelvic floor  - As you exhale, gently engage the transverse abdominis by drawing the belly button down to the spine.  - Inhale again to rest  *Don't hold your breath  *Can perform lying down or sitting up          SI Belt link:   [email or web address]

## 2024-11-29 ENCOUNTER — PATIENT MESSAGE (OUTPATIENT)
Dept: OTHER | Facility: OTHER | Age: 34
End: 2024-11-29
Payer: COMMERCIAL

## 2024-12-04 ENCOUNTER — TELEPHONE (OUTPATIENT)
Dept: OBSTETRICS AND GYNECOLOGY | Facility: CLINIC | Age: 34
End: 2024-12-04
Payer: COMMERCIAL

## 2024-12-05 ENCOUNTER — PROCEDURE VISIT (OUTPATIENT)
Dept: MATERNAL FETAL MEDICINE | Facility: CLINIC | Age: 34
End: 2024-12-05
Payer: COMMERCIAL

## 2024-12-05 ENCOUNTER — ROUTINE PRENATAL (OUTPATIENT)
Dept: OBSTETRICS AND GYNECOLOGY | Facility: CLINIC | Age: 34
End: 2024-12-05
Attending: STUDENT IN AN ORGANIZED HEALTH CARE EDUCATION/TRAINING PROGRAM
Payer: COMMERCIAL

## 2024-12-05 VITALS — WEIGHT: 173.63 LBS | BODY MASS INDEX: 29.8 KG/M2 | SYSTOLIC BLOOD PRESSURE: 110 MMHG | DIASTOLIC BLOOD PRESSURE: 69 MMHG

## 2024-12-05 DIAGNOSIS — O09.523 AMA (ADVANCED MATERNAL AGE) MULTIGRAVIDA 35+, THIRD TRIMESTER: Primary | ICD-10-CM

## 2024-12-05 DIAGNOSIS — Z3A.32 32 WEEKS GESTATION OF PREGNANCY: Primary | ICD-10-CM

## 2024-12-05 DIAGNOSIS — O09.523 AMA (ADVANCED MATERNAL AGE) MULTIGRAVIDA 35+, THIRD TRIMESTER: ICD-10-CM

## 2024-12-05 PROCEDURE — 87088 URINE BACTERIA CULTURE: CPT | Performed by: STUDENT IN AN ORGANIZED HEALTH CARE EDUCATION/TRAINING PROGRAM

## 2024-12-05 PROCEDURE — 87086 URINE CULTURE/COLONY COUNT: CPT | Performed by: STUDENT IN AN ORGANIZED HEALTH CARE EDUCATION/TRAINING PROGRAM

## 2024-12-05 PROCEDURE — 99999 PR PBB SHADOW E&M-EST. PATIENT-LVL III: CPT | Mod: PBBFAC,,, | Performed by: STUDENT IN AN ORGANIZED HEALTH CARE EDUCATION/TRAINING PROGRAM

## 2024-12-05 PROCEDURE — 76816 OB US FOLLOW-UP PER FETUS: CPT | Mod: S$GLB,,, | Performed by: OBSTETRICS & GYNECOLOGY

## 2024-12-06 ENCOUNTER — PATIENT MESSAGE (OUTPATIENT)
Dept: OBSTETRICS AND GYNECOLOGY | Facility: CLINIC | Age: 34
End: 2024-12-06
Payer: COMMERCIAL

## 2024-12-07 ENCOUNTER — PATIENT MESSAGE (OUTPATIENT)
Dept: OBSTETRICS AND GYNECOLOGY | Facility: CLINIC | Age: 34
End: 2024-12-07
Payer: COMMERCIAL

## 2024-12-07 LAB — BACTERIA UR CULT: ABNORMAL

## 2024-12-09 NOTE — TELEPHONE ENCOUNTER
Spoke with pt and all questions answered.  Will repeat urine culture.  Asymptomatic.  She is in agreement with plan.

## 2024-12-16 NOTE — PROGRESS NOTES
[facility]   Physical Therapy Treatment Note      [id number]    Therapy Diagnosis:   Encounter Diagnoses   Name Primary?    Hip weakness Yes    Pregnancy related back pain, antepartum      Physician: Anastasia Garnett, *    Visit Date: 12/20/2024    Referring Provider Orders: PT Eval and Treat  Medical Diagnosis from Referral: 29 weeks gestation of pregnancy [Z3A.29]   Evaluation Date: 11/20/2024  Authorization Period Expiration: 12/31/2024  Plan of Care Expiration: 2/20/2025  Progress Note Due: 12/20/2024  Visit # / Visits authorized: 2/21  FOTO: 1 (11/20/2024),     Time In: 10:30 am   Time Out: 11:26 am   Total Billable Time: 56 minutes    Precautions: Standard,  35 weeks pregnant    Subjective     Pt reports: she has SI joint pain that is making it very hard for her to walk, get in and out of her car, and roll over in her sleep. She also talked to her OBGYN about pain with intercourse and wants to check out the pelvic floor today to see what can be done. Pt states she may be using her SI belt incorrectly, as she has not found much relief when wearing this belt. She has been working on her stretches since last session.     She was compliant with home exercise program.  Response to previous treatment: no adverse effects  Functional change: ongoing    Pain: low back/SI     Constitutional Symptoms Review: The patient denies having any constitutional symptoms.       Objective      VAGINAL PELVIC FLOOR EXAM - external assessment  Introitus: stenotic  Skin condition: WNL  Scarring: none   Sensation: WNL   Pain: none  Voluntary contraction: visible lift  Voluntary relaxation: visible drop  Bearing down: visible lift     VAGINAL PELVIC FLOOR EXAM - internal assessment  Pain: tender areas noted as follows: L>R levator ani, bilateral layer 1    Sensation: able to localized pressure appropriately   Vaginal vault: stenotic   Muscle Bulk: hypertonus    Muscle Power: 3-4/5  Muscle Endurance: 4 seconds  # Reps To Fatigue: 2      Quality of contraction: decreased hold   Specificity: WNL   Coordination: WNL   Prolapse check: none    Sacroiliac Joint Screen  XIOMARA: (-) right, (+) L   Supine to Long sit: no deviation noted      Cluster of [place]:   Thigh thrust: (-) right, (+) left  Distraction: (+)  Compression test: (+)  Sacral thrust: not assessed this session     Treatment   Leanna received the treatments listed below:    Pt verbally consents to intravaginal treatment today.  Signed consent form already on file.     Manual Therapy to develop flexibility, extensibility, and desensitization for 8 minutes including:   [x] HVLAT to L SI joint (bro-roll) in side-lying x2   [x] Hip distraction with belt to L hip in supine with head of mat elevated   [x] Internal trigger point release to bilateral levator ani   [x] Perineal stretching to layer 1 pelvic floor muscles    Therapeutic Activity Patient participated in dynamic functional therapeutic activities to improve functional performance for 48 minutes. Including: Education as described below.  [x] Pelvic floor assessment- see objective   [x] SI joint screen- see objective  [x] Instruction on self-release to PFM to reduce discomfort with initial penetration - pt able to return demonstrate independently after initial instruction  [x] Education on pelvic floor anatomy & function, role in labor & delivery  [x] Education on risk factors for perineal tearing and how to reduce risk/severity of perineal tearing  [x] Education on how to perform perineal massage  [x] Education on non-medical pain relief options during labor  [x] Education on breathing techniques during pushing   [x] Education on positioning to engage baby's head in pelvic inlet/mid-pelvis/outlet: side-lying, quadruped, standing  [x] Education on positioning during delivery, including flexible sacrum positions  [x] Instruction on comfort measures during labor  for pt's partner  [x] Instruction on SI belt application  [x] Instruction on at home hip distraction   [x] Discussed progression of plan of care with patient  [x] HEP building/HEP review    Education provided:   Birth prep- see above   Discussed progression of plan of care with patient; educated pt in activity modification; reviewed HEP with pt. Pt demonstrated and verbalized understanding of all instruction and was provided with a handout of HEP (see Patient Instructions).    Written Home Exercises Provided: yes.  Exercises were reviewed and Leanna was able to demonstrate them prior to the end of the session.  Leanna demonstrated good  understanding of the education provided.     See EMR under Patient Instructions for exercises provided 12/20/2024.      Assessment     Pt's  in room for entirety of session today. Pt tolerated session well, with good understanding of education provided. Pt's reports of SI pain are consistent with positive L XIOMARA, thigh thrust, compression, and distraction. Shankar performed this session to improve joint mobility and reduce pain in L SI joint; therapeutic effect achieved as pt reports decreased pain post-treatment. Pt interested in  performing hip distraction at home;  educated on how to perform technique and was able to demonstrate independently today. Upon vaginal assessment, pt presents with increased tone and tenderness in bilateral levator ani and layer 1 pelvic floor muscles. Therapeutic effect of internal release achieved, as noted by increased muscular extensibility and pt reports of decreased pain. Pt contracts pelvic floor with verbal cuing to bear down; PT to work with patient on proper bearing down techniques via biofeedback next session to prepare for delivery. Pt will continue to benefit from skilled outpatient physical therapy to address the deficits listed in the problem list box on initial evaluation, provide pt/family education and to  maximize pt's level of independence in the home and community environment.     Leanna Is progressing well towards her goals.   Pt prognosis is Good.     Pt will continue to benefit from skilled outpatient physical therapy to address the deficits listed in the problem list box on initial evaluation, provide pt/family education and to maximize pt's level of independence in the home and community environment.     Pt's spiritual, cultural and educational needs considered and pt agreeable to plan of care and goals.     Anticipated barriers to physical therapy: none    Goals:  Short Term Goals: 6 weeks   Pt to report 50% improvement in low back and round ligament pain   Patient to demonstrate good body mechanics with sit <--> stand and side lying <--> sitting transfers to demonstrate improved functional mobility.    Pt to demonstrate being able to correctly and consistently relax and lengthen her pelvic floor muscle(s) in preparation for labor and delivery.    Pt to demonstrate proper diaphragmatic breathing to help with shortness of breath and calming the nervous system. - MET  Pt to demonstrate proper body mechanics with lifting, bending and squatting to decrease strain on lumbopelvic structures during pregnancy.    Pt to be independent with introductory home exercise program - MET     Long Term Goals: 12 weeks  Pt to demonstrate bilateral hip strength of at least 4 to 4+/5 for improved pelvic girdle support with load transfer   Pt to be independent with SI belt application to reduce pain- MET  Pt to report being able to sneeze without incontinence demonstrating improved pelvic floor strength and coordination to improve confidence in social situations.   Pt will be trained and compliant with postural strategies in sitting and standing to improve alignment and decrease pain and muscle fatigue.  Pt to score no more than 24% impairment on the PFDI Pain FOTO survey to demonstrate reduced impairment due to pelvic floor  dysfunction   Pt to be independent with advanced home exercise program      Plan     Continue with established Plan of Care, working toward established PT goals.    Next session: push prep     Penny Chapa, PT, DPT

## 2024-12-17 ENCOUNTER — TELEPHONE (OUTPATIENT)
Dept: OBSTETRICS AND GYNECOLOGY | Facility: CLINIC | Age: 34
End: 2024-12-17
Payer: COMMERCIAL

## 2024-12-17 NOTE — TELEPHONE ENCOUNTER
Spoke with pt d/t elevated BP reading.  States she feels fine, denies HA, visual changes and epigastric pain.  Scheduled with Gsiell tomorrow d/t pt's work meetings.  ED precautions discussed.

## 2024-12-18 ENCOUNTER — ROUTINE PRENATAL (OUTPATIENT)
Dept: OBSTETRICS AND GYNECOLOGY | Facility: CLINIC | Age: 34
End: 2024-12-18
Payer: COMMERCIAL

## 2024-12-18 VITALS
BODY MASS INDEX: 29.74 KG/M2 | SYSTOLIC BLOOD PRESSURE: 122 MMHG | WEIGHT: 173.31 LBS | DIASTOLIC BLOOD PRESSURE: 82 MMHG

## 2024-12-18 DIAGNOSIS — Z3A.34 34 WEEKS GESTATION OF PREGNANCY: Primary | ICD-10-CM

## 2024-12-18 LAB
CREAT UR-MCNC: 19 MG/DL (ref 15–325)
PROT UR-MCNC: <7 MG/DL (ref 0–15)
PROT/CREAT UR: NORMAL MG/G{CREAT} (ref 0–0.2)

## 2024-12-18 PROCEDURE — 99999 PR PBB SHADOW E&M-EST. PATIENT-LVL III: CPT | Mod: PBBFAC,,,

## 2024-12-18 PROCEDURE — 82570 ASSAY OF URINE CREATININE: CPT

## 2024-12-18 NOTE — PROGRESS NOTES
OB at 34w5d, here for BP check.  Connected mom BP's upper 130s/90s.   No hx of elevated pressures.  Pt reports recent travel and increased stress.   Denies HA's, visual changes, RUQ pain, or swelling. BP in clinic today 122/82.   Reports good FM. -LOF/VB/CTX.   Will go ahead and send urine for baseline protein/creatinine ratio.   Reviewed warning signs of Labor and Preeclampsia.   Has JORGE scheduled for next week on Tuesday.

## 2024-12-20 ENCOUNTER — CLINICAL SUPPORT (OUTPATIENT)
Dept: REHABILITATION | Facility: OTHER | Age: 34
End: 2024-12-20
Payer: COMMERCIAL

## 2024-12-20 ENCOUNTER — PATIENT MESSAGE (OUTPATIENT)
Dept: OTHER | Facility: OTHER | Age: 34
End: 2024-12-20
Payer: COMMERCIAL

## 2024-12-20 DIAGNOSIS — M54.9 PREGNANCY RELATED BACK PAIN, ANTEPARTUM: ICD-10-CM

## 2024-12-20 DIAGNOSIS — O26.899 PREGNANCY RELATED BACK PAIN, ANTEPARTUM: ICD-10-CM

## 2024-12-20 DIAGNOSIS — R29.898 HIP WEAKNESS: Primary | ICD-10-CM

## 2024-12-20 PROCEDURE — 97530 THERAPEUTIC ACTIVITIES: CPT | Mod: PN

## 2024-12-20 PROCEDURE — 97140 MANUAL THERAPY 1/> REGIONS: CPT | Mod: PN

## 2024-12-22 NOTE — PATIENT INSTRUCTIONS
Manual Release to Pelvic Floor   Wash your hands with soap and water.   Find a place in your home that is safe, quiet, and comfortable   Position yourself in a partially reclined position with your knees bend, such as your back against the head of your bed with pillows behind you for cushioning. The most comfortable and relaxed for the pelvic floor is to allow the legs to drop completely open, with or without support under your knees   - Some people also prefer being seated on the toilet, bent forward with one elbow resting on a new and using the other hand to perform the stretching  - You can also do this in the bath tub with warm water  Use a generous amount of water-based lubricant on your thumb   Begin your session by breathing in and allowing your belly to expand, followed by exhaling, allowing your belly to slowly fall. The act of slowly exhaling helps to naturally relax the pelvic floor muscles  Repeat the deep breathing pattern, and continue to do so steadily and deliberately. Gently bring your thumb to the opening of the vagina and carefully insert it on an exhale  Think of your pelvic floor as a clock, where the clitoris is at 12 oclock and the back passage is at 6 oclock. Aim for 5 oclock or 7 oclock. You should always avoid 11-1 o'clock and 5-7 o'clock as that's where your urethra and bowel sit. We don't want to press on them  Gently sweep your thumb until you encounter a tender point. When you find tender point, gently compress your thumb into the tender point with the same firmness you would use to check a tomato for ripeness. For example, dont press so hard you squish your tomato  Maintain gentle pressure on the tender point and slowly. When you find this position, remain there for 30 sec - 1 min to allow the tender point to fully release. Continue to breathe deeply  Repeat this process 1 or 2 times per day as needed  NOTE: during stretching, go to the point of discomfort and not pain. Do not  hold breath and focus on keeping vaginal muscles fully relaxed     Therawand for internal release:   [email or web address]

## 2024-12-24 ENCOUNTER — ROUTINE PRENATAL (OUTPATIENT)
Dept: OBSTETRICS AND GYNECOLOGY | Facility: CLINIC | Age: 34
End: 2024-12-24
Attending: STUDENT IN AN ORGANIZED HEALTH CARE EDUCATION/TRAINING PROGRAM
Payer: COMMERCIAL

## 2024-12-24 ENCOUNTER — CLINICAL SUPPORT (OUTPATIENT)
Dept: OBSTETRICS AND GYNECOLOGY | Facility: CLINIC | Age: 34
End: 2024-12-24
Payer: COMMERCIAL

## 2024-12-24 VITALS — WEIGHT: 175.38 LBS | DIASTOLIC BLOOD PRESSURE: 76 MMHG | SYSTOLIC BLOOD PRESSURE: 116 MMHG | BODY MASS INDEX: 30.1 KG/M2

## 2024-12-24 DIAGNOSIS — O26.849 UTERINE SIZE DATE DISCREPANCY PREGNANCY: Primary | ICD-10-CM

## 2024-12-24 DIAGNOSIS — Z29.11 NEED FOR RSV VACCINATION: Primary | ICD-10-CM

## 2024-12-24 PROCEDURE — 90678 RSV VACC PREF BIVALENT IM: CPT | Mod: S$GLB,,, | Performed by: STUDENT IN AN ORGANIZED HEALTH CARE EDUCATION/TRAINING PROGRAM

## 2024-12-24 PROCEDURE — 99999 PR PBB SHADOW E&M-EST. PATIENT-LVL III: CPT | Mod: PBBFAC,,, | Performed by: STUDENT IN AN ORGANIZED HEALTH CARE EDUCATION/TRAINING PROGRAM

## 2024-12-24 PROCEDURE — 90471 IMMUNIZATION ADMIN: CPT | Mod: S$GLB,,, | Performed by: STUDENT IN AN ORGANIZED HEALTH CARE EDUCATION/TRAINING PROGRAM

## 2024-12-24 PROCEDURE — 0502F SUBSEQUENT PRENATAL CARE: CPT | Mod: CPTII,S$GLB,, | Performed by: STUDENT IN AN ORGANIZED HEALTH CARE EDUCATION/TRAINING PROGRAM

## 2024-12-24 PROCEDURE — 99999 PR PBB SHADOW E&M-EST. PATIENT-LVL I: CPT | Mod: PBBFAC,,,

## 2024-12-24 NOTE — PROGRESS NOTES
Ordering Provider: Dr. Garnett    Pt received RSV  to RIGHT deltoid.  Pt tolerated injection well.  Pt was instructed to wait 15 minutes to monitor for signs and symptoms of any reactions.  Pt has no further questions, comments, or concerns at this time.

## 2024-12-24 NOTE — PROGRESS NOTES
Doing well.  No complaints.  Reports fetal movement.  Denies contractions, leakage of fluid, vaginal bleeding.  RSV vaccine today.  Growth and visit in 1 week.  All questions answered.  RTC in 1 week or sooner if needed.

## 2024-12-27 ENCOUNTER — PATIENT MESSAGE (OUTPATIENT)
Dept: OBSTETRICS AND GYNECOLOGY | Facility: CLINIC | Age: 34
End: 2024-12-27
Payer: COMMERCIAL

## 2024-12-28 ENCOUNTER — ANESTHESIA EVENT (OUTPATIENT)
Dept: OBSTETRICS AND GYNECOLOGY | Facility: OTHER | Age: 34
End: 2024-12-28
Payer: COMMERCIAL

## 2024-12-28 ENCOUNTER — HOSPITAL ENCOUNTER (INPATIENT)
Facility: OTHER | Age: 34
LOS: 5 days | Discharge: HOME | End: 2025-01-03
Attending: OBSTETRICS & GYNECOLOGY | Admitting: OBSTETRICS & GYNECOLOGY
Payer: COMMERCIAL

## 2024-12-28 ENCOUNTER — ANESTHESIA (OUTPATIENT)
Dept: OBSTETRICS AND GYNECOLOGY | Facility: OTHER | Age: 34
End: 2024-12-28
Payer: COMMERCIAL

## 2024-12-28 DIAGNOSIS — Z34.90 ENCOUNTER FOR INDUCTION OF LABOR: ICD-10-CM

## 2024-12-28 DIAGNOSIS — Z98.891 STATUS POST CESAREAN DELIVERY: Primary | ICD-10-CM

## 2024-12-28 DIAGNOSIS — Z3A.36 36 WEEKS GESTATION OF PREGNANCY: ICD-10-CM

## 2024-12-28 DIAGNOSIS — R06.02 SHORTNESS OF BREATH: ICD-10-CM

## 2024-12-28 DIAGNOSIS — K21.9 GASTROESOPHAGEAL REFLUX DISEASE, UNSPECIFIED WHETHER ESOPHAGITIS PRESENT: ICD-10-CM

## 2024-12-28 DIAGNOSIS — R74.8 ABNORMAL TRANSAMINASES: ICD-10-CM

## 2024-12-28 DIAGNOSIS — R74.01 TRANSAMINITIS: ICD-10-CM

## 2024-12-28 DIAGNOSIS — R07.9 CHEST PAIN: ICD-10-CM

## 2024-12-28 DIAGNOSIS — O16.3 ELEVATED BLOOD PRESSURE AFFECTING PREGNANCY IN THIRD TRIMESTER, ANTEPARTUM: ICD-10-CM

## 2024-12-28 PROBLEM — O16.9 ELEVATED BLOOD PRESSURE AFFECTING PREGNANCY, ANTEPARTUM: Status: ACTIVE | Noted: 2024-12-28

## 2024-12-28 PROBLEM — O09.523 MULTIGRAVIDA OF ADVANCED MATERNAL AGE IN THIRD TRIMESTER: Status: ACTIVE | Noted: 2024-12-28

## 2024-12-28 LAB
ALBUMIN SERPL BCP-MCNC: 2.7 G/DL (ref 3.5–5.2)
ALBUMIN SERPL BCP-MCNC: 2.9 G/DL (ref 3.5–5.2)
ALP SERPL-CCNC: 235 U/L (ref 40–150)
ALP SERPL-CCNC: 251 U/L (ref 40–150)
ALT SERPL W/O P-5'-P-CCNC: 83 U/L (ref 10–44)
ALT SERPL W/O P-5'-P-CCNC: 84 U/L (ref 10–44)
ANION GAP SERPL CALC-SCNC: 10 MMOL/L (ref 8–16)
ANION GAP SERPL CALC-SCNC: 12 MMOL/L (ref 8–16)
AST SERPL-CCNC: 66 U/L (ref 10–40)
AST SERPL-CCNC: 67 U/L (ref 10–40)
BASOPHILS # BLD AUTO: 0.03 K/UL (ref 0–0.2)
BASOPHILS # BLD AUTO: 0.04 K/UL (ref 0–0.2)
BASOPHILS NFR BLD: 0.3 % (ref 0–1.9)
BASOPHILS NFR BLD: 0.3 % (ref 0–1.9)
BILIRUB SERPL-MCNC: 0.2 MG/DL (ref 0.1–1)
BILIRUB SERPL-MCNC: 0.2 MG/DL (ref 0.1–1)
BILIRUBIN, POC UA: NEGATIVE
BLOOD, POC UA: ABNORMAL
BUN SERPL-MCNC: 10 MG/DL (ref 6–20)
BUN SERPL-MCNC: 12 MG/DL (ref 6–20)
CALCIUM SERPL-MCNC: 9.2 MG/DL (ref 8.7–10.5)
CALCIUM SERPL-MCNC: 9.3 MG/DL (ref 8.7–10.5)
CHLORIDE SERPL-SCNC: 108 MMOL/L (ref 95–110)
CHLORIDE SERPL-SCNC: 108 MMOL/L (ref 95–110)
CLARITY, UA: CLEAR
CO2 SERPL-SCNC: 15 MMOL/L (ref 23–29)
CO2 SERPL-SCNC: 18 MMOL/L (ref 23–29)
COLOR, UA: YELLOW
CREAT SERPL-MCNC: 0.9 MG/DL (ref 0.5–1.4)
CREAT SERPL-MCNC: 1 MG/DL (ref 0.5–1.4)
CREAT UR-MCNC: 84.6 MG/DL (ref 15–325)
DIFFERENTIAL METHOD BLD: ABNORMAL
DIFFERENTIAL METHOD BLD: ABNORMAL
EOSINOPHIL # BLD AUTO: 0 K/UL (ref 0–0.5)
EOSINOPHIL # BLD AUTO: 0 K/UL (ref 0–0.5)
EOSINOPHIL NFR BLD: 0.2 % (ref 0–8)
EOSINOPHIL NFR BLD: 0.3 % (ref 0–8)
ERYTHROCYTE [DISTWIDTH] IN BLOOD BY AUTOMATED COUNT: 13.2 % (ref 11.5–14.5)
ERYTHROCYTE [DISTWIDTH] IN BLOOD BY AUTOMATED COUNT: 13.3 % (ref 11.5–14.5)
EST. GFR  (NO RACE VARIABLE): >60 ML/MIN/1.73 M^2
EST. GFR  (NO RACE VARIABLE): >60 ML/MIN/1.73 M^2
GLUCOSE SERPL-MCNC: 83 MG/DL (ref 70–110)
GLUCOSE SERPL-MCNC: 96 MG/DL (ref 70–110)
GLUCOSE, POC UA: NEGATIVE
HCT VFR BLD AUTO: 33.7 % (ref 37–48.5)
HCT VFR BLD AUTO: 35.4 % (ref 37–48.5)
HGB BLD-MCNC: 11.9 G/DL (ref 12–16)
HGB BLD-MCNC: 12.7 G/DL (ref 12–16)
IMM GRANULOCYTES # BLD AUTO: 0.1 K/UL (ref 0–0.04)
IMM GRANULOCYTES # BLD AUTO: 0.1 K/UL (ref 0–0.04)
IMM GRANULOCYTES NFR BLD AUTO: 0.8 % (ref 0–0.5)
IMM GRANULOCYTES NFR BLD AUTO: 0.9 % (ref 0–0.5)
KETONES, POC UA: 15 MG/DL
LEUKOCYTE EST, POC UA: NEGATIVE
LYMPHOCYTES # BLD AUTO: 1.7 K/UL (ref 1–4.8)
LYMPHOCYTES # BLD AUTO: 1.9 K/UL (ref 1–4.8)
LYMPHOCYTES NFR BLD: 13.1 % (ref 18–48)
LYMPHOCYTES NFR BLD: 17.2 % (ref 18–48)
MCH RBC QN AUTO: 32.4 PG (ref 27–31)
MCH RBC QN AUTO: 33 PG (ref 27–31)
MCHC RBC AUTO-ENTMCNC: 35.3 G/DL (ref 32–36)
MCHC RBC AUTO-ENTMCNC: 35.9 G/DL (ref 32–36)
MCV RBC AUTO: 92 FL (ref 82–98)
MCV RBC AUTO: 92 FL (ref 82–98)
MONOCYTES # BLD AUTO: 0.7 K/UL (ref 0.3–1)
MONOCYTES # BLD AUTO: 0.7 K/UL (ref 0.3–1)
MONOCYTES NFR BLD: 5.2 % (ref 4–15)
MONOCYTES NFR BLD: 6.4 % (ref 4–15)
NEUTROPHILS # BLD AUTO: 10.3 K/UL (ref 1.8–7.7)
NEUTROPHILS # BLD AUTO: 8.2 K/UL (ref 1.8–7.7)
NEUTROPHILS NFR BLD: 74.9 % (ref 38–73)
NEUTROPHILS NFR BLD: 80.4 % (ref 38–73)
NITRITE, POC UA: NEGATIVE
NRBC BLD-RTO: 0 /100 WBC
NRBC BLD-RTO: 0 /100 WBC
PH UR STRIP: 6 [PH] (ref 5–8)
PLATELET # BLD AUTO: 171 K/UL (ref 150–450)
PLATELET # BLD AUTO: 192 K/UL (ref 150–450)
PMV BLD AUTO: 11.2 FL (ref 9.2–12.9)
PMV BLD AUTO: 11.6 FL (ref 9.2–12.9)
POTASSIUM SERPL-SCNC: 3.7 MMOL/L (ref 3.5–5.1)
POTASSIUM SERPL-SCNC: 3.8 MMOL/L (ref 3.5–5.1)
PROT SERPL-MCNC: 6.3 G/DL (ref 6–8.4)
PROT SERPL-MCNC: 6.9 G/DL (ref 6–8.4)
PROT UR-MCNC: 10 MG/DL (ref 0–15)
PROT/CREAT UR: 0.12 MG/G{CREAT} (ref 0–0.2)
PROTEIN, POC UA: 30 MG/DL
RBC # BLD AUTO: 3.67 M/UL (ref 4–5.4)
RBC # BLD AUTO: 3.85 M/UL (ref 4–5.4)
SODIUM SERPL-SCNC: 135 MMOL/L (ref 136–145)
SODIUM SERPL-SCNC: 136 MMOL/L (ref 136–145)
SPECIFIC GRAVITY, POC UA: 1.02 (ref 1–1.03)
TREPONEMA PALLIDUM IGG+IGM AB [PRESENCE] IN SERUM OR PLASMA BY IMMUNOASSAY: NONREACTIVE
UROBILINOGEN, POC UA: 0.2 E.U./DL
WBC # BLD AUTO: 10.88 K/UL (ref 3.9–12.7)
WBC # BLD AUTO: 12.78 K/UL (ref 3.9–12.7)

## 2024-12-28 PROCEDURE — 93005 ELECTROCARDIOGRAM TRACING: CPT

## 2024-12-28 PROCEDURE — 25000003 PHARM REV CODE 250

## 2024-12-28 PROCEDURE — 59025 FETAL NON-STRESS TEST: CPT | Mod: 26,,, | Performed by: OBSTETRICS & GYNECOLOGY

## 2024-12-28 PROCEDURE — G0378 HOSPITAL OBSERVATION PER HR: HCPCS

## 2024-12-28 PROCEDURE — 82570 ASSAY OF URINE CREATININE: CPT

## 2024-12-28 PROCEDURE — 3E033VJ INTRODUCTION OF OTHER HORMONE INTO PERIPHERAL VEIN, PERCUTANEOUS APPROACH: ICD-10-PCS | Performed by: OBSTETRICS & GYNECOLOGY

## 2024-12-28 PROCEDURE — 25000003 PHARM REV CODE 250: Performed by: OBSTETRICS & GYNECOLOGY

## 2024-12-28 PROCEDURE — 36415 COLL VENOUS BLD VENIPUNCTURE: CPT

## 2024-12-28 PROCEDURE — 80053 COMPREHEN METABOLIC PANEL: CPT | Performed by: OBSTETRICS & GYNECOLOGY

## 2024-12-28 PROCEDURE — 80053 COMPREHEN METABOLIC PANEL: CPT | Mod: 91

## 2024-12-28 PROCEDURE — 85025 COMPLETE CBC W/AUTO DIFF WBC: CPT | Mod: 91

## 2024-12-28 PROCEDURE — 86593 SYPHILIS TEST NON-TREP QUANT: CPT | Performed by: OBSTETRICS & GYNECOLOGY

## 2024-12-28 PROCEDURE — 4A0HXCZ MEASUREMENT OF PRODUCTS OF CONCEPTION, CARDIAC RATE, EXTERNAL APPROACH: ICD-10-PCS | Performed by: OBSTETRICS & GYNECOLOGY

## 2024-12-28 PROCEDURE — 85025 COMPLETE CBC W/AUTO DIFF WBC: CPT | Performed by: OBSTETRICS & GYNECOLOGY

## 2024-12-28 PROCEDURE — 93010 ELECTROCARDIOGRAM REPORT: CPT | Mod: ,,, | Performed by: INTERNAL MEDICINE

## 2024-12-28 PROCEDURE — 87081 CULTURE SCREEN ONLY: CPT

## 2024-12-28 PROCEDURE — 99284 EMERGENCY DEPT VISIT MOD MDM: CPT | Mod: 25,,, | Performed by: OBSTETRICS & GYNECOLOGY

## 2024-12-28 RX ORDER — FAMOTIDINE 20 MG/1
20 TABLET, FILM COATED ORAL 2 TIMES DAILY
Status: DISCONTINUED | OUTPATIENT
Start: 2024-12-28 | End: 2024-12-29

## 2024-12-28 RX ORDER — DIPHENHYDRAMINE HCL 25 MG
25 CAPSULE ORAL EVERY 4 HOURS PRN
Status: DISCONTINUED | OUTPATIENT
Start: 2024-12-28 | End: 2024-12-29 | Stop reason: SDUPTHER

## 2024-12-28 RX ORDER — PRENATAL WITH FERROUS FUM AND FOLIC ACID 3080; 920; 120; 400; 22; 1.84; 3; 20; 10; 1; 12; 200; 27; 25; 2 [IU]/1; [IU]/1; MG/1; [IU]/1; MG/1; MG/1; MG/1; MG/1; MG/1; MG/1; UG/1; MG/1; MG/1; MG/1; MG/1
1 TABLET ORAL DAILY
Status: DISCONTINUED | OUTPATIENT
Start: 2024-12-28 | End: 2024-12-29

## 2024-12-28 RX ORDER — DIPHENHYDRAMINE HCL 25 MG
25 CAPSULE ORAL EVERY 6 HOURS PRN
Status: DISCONTINUED | OUTPATIENT
Start: 2024-12-28 | End: 2024-12-28 | Stop reason: SDUPTHER

## 2024-12-28 RX ORDER — ONDANSETRON 8 MG/1
8 TABLET, ORALLY DISINTEGRATING ORAL EVERY 8 HOURS PRN
Status: DISCONTINUED | OUTPATIENT
Start: 2024-12-28 | End: 2024-12-29 | Stop reason: SDUPTHER

## 2024-12-28 RX ORDER — SIMETHICONE 80 MG
1 TABLET,CHEWABLE ORAL EVERY 6 HOURS PRN
Status: DISCONTINUED | OUTPATIENT
Start: 2024-12-28 | End: 2024-12-29 | Stop reason: SDUPTHER

## 2024-12-28 RX ORDER — DIPHENHYDRAMINE HCL 25 MG
25 CAPSULE ORAL EVERY 6 HOURS PRN
Status: DISCONTINUED | OUTPATIENT
Start: 2024-12-28 | End: 2024-12-28

## 2024-12-28 RX ORDER — METOCLOPRAMIDE 10 MG/1
10 TABLET ORAL
Status: DISCONTINUED | OUTPATIENT
Start: 2024-12-28 | End: 2024-12-29

## 2024-12-28 RX ORDER — ASPIRIN 81 MG/1
81 TABLET ORAL DAILY
Status: DISCONTINUED | OUTPATIENT
Start: 2024-12-28 | End: 2024-12-29

## 2024-12-28 RX ORDER — ACETAMINOPHEN 500 MG
1000 TABLET ORAL ONCE
Status: COMPLETED | OUTPATIENT
Start: 2024-12-28 | End: 2024-12-28

## 2024-12-28 RX ORDER — DIPHENHYDRAMINE HYDROCHLORIDE 50 MG/ML
25 INJECTION INTRAMUSCULAR; INTRAVENOUS EVERY 4 HOURS PRN
Status: DISCONTINUED | OUTPATIENT
Start: 2024-12-28 | End: 2024-12-29

## 2024-12-28 RX ORDER — SODIUM CITRATE AND CITRIC ACID MONOHYDRATE 334; 500 MG/5ML; MG/5ML
30 SOLUTION ORAL ONCE
Status: COMPLETED | OUTPATIENT
Start: 2024-12-28 | End: 2024-12-28

## 2024-12-28 RX ORDER — AMOXICILLIN 250 MG
1 CAPSULE ORAL NIGHTLY PRN
Status: DISCONTINUED | OUTPATIENT
Start: 2024-12-28 | End: 2024-12-29

## 2024-12-28 RX ORDER — METOCLOPRAMIDE 5 MG/1
10 TABLET ORAL
Status: DISCONTINUED | OUTPATIENT
Start: 2024-12-28 | End: 2024-12-28

## 2024-12-28 RX ORDER — SODIUM CHLORIDE 0.9 % (FLUSH) 0.9 %
10 SYRINGE (ML) INJECTION
Status: DISCONTINUED | OUTPATIENT
Start: 2024-12-28 | End: 2024-12-29

## 2024-12-28 RX ADMIN — ASPIRIN 81 MG: 81 TABLET, COATED ORAL at 04:12

## 2024-12-28 RX ADMIN — DIPHENHYDRAMINE HYDROCHLORIDE 25 MG: 25 CAPSULE ORAL at 02:12

## 2024-12-28 RX ADMIN — ACETAMINOPHEN 1000 MG: 500 TABLET, FILM COATED ORAL at 01:12

## 2024-12-28 RX ADMIN — FAMOTIDINE 20 MG: 20 TABLET, FILM COATED ORAL at 04:12

## 2024-12-28 RX ADMIN — METOCLOPRAMIDE 10 MG: 5 TABLET ORAL at 02:12

## 2024-12-28 RX ADMIN — SODIUM CITRATE AND CITRIC ACID MONOHYDRATE 30 ML: 500; 334 SOLUTION ORAL at 01:12

## 2024-12-28 RX ADMIN — PRENATAL VIT W/ FE FUMARATE-FA TAB 27-0.8 MG 1 TABLET: 27-0.8 TAB at 04:12

## 2024-12-28 NOTE — ANESTHESIA PREPROCEDURE EVALUATION
"[facility]  Anesthesia Pre-Operative Evaluation         Patient Name: Leanna Yee  YOB: 1990  MRN: [id number]    2024      Leanna Yee is a 34 y.o. female  @ 36w1d who presents to the OB ED today (2024) with CC of headache, chest pain, feeling generally "unwell".     Patient notes headache 3-4/10, feels very similar to prior migraines. Patient denies fevers or chills.      She reports no vaginal bleeding, no leakage of fluid, and no contractions.   She reports good fetal movement.     This pregnancy is complicated by AMA, low lying placenta, now resolved.    Patient denies cardiopulmonary disease, bleeding disorders, or spine pathology.       OB History    Para Term  AB Living   1 0 0 0 0 0   SAB IAB Ectopic Multiple Live Births   0 0 0 0 0      # Outcome Date GA Lbr Bashir/2nd Weight Sex Type Anes PTL Lv   1 Current                Review of patient's allergies indicates:   Allergen Reactions    Prochlorperazine Other (See Comments)     Lost control of neck and eye muscles       Wt Readings from Last 1 Encounters:   24 0848 79.5 kg (175 lb 6 oz)       BP Readings from Last 3 Encounters:   24 116/81   24 116/76   24 122/82       Patient Active Problem List   Diagnosis    Hip weakness    Pregnancy related back pain, antepartum    Transaminitis    Multigravida of advanced maternal age in third trimester    Elevated blood pressure affecting pregnancy, antepartum       Past Surgical History:   Procedure Laterality Date    APPENDECTOMY         Social History     Socioeconomic History    Marital status:    Tobacco Use    Smoking status: Never     Passive exposure: Never    Smokeless tobacco: Never   Substance and Sexual Activity    Alcohol use: Yes     Comment: socially    Drug use: Never    Sexual activity: Yes     Partners: Male     Birth control/protection: None         Chemistry        Component Value Date/Time    " " (L) 12/28/2024 1335    K 3.8 12/28/2024 1335     12/28/2024 1335    CO2 15 (L) 12/28/2024 1335    BUN 12 12/28/2024 1335    CREATININE 0.9 12/28/2024 1335    GLU 83 12/28/2024 1335        Component Value Date/Time    CALCIUM 9.3 12/28/2024 1335    ALKPHOS 251 (H) 12/28/2024 1335    AST 66 (H) 12/28/2024 1335    ALT 83 (H) 12/28/2024 1335    BILITOT 0.2 12/28/2024 1335            Lab Results   Component Value Date    WBC 12.78 (H) 12/28/2024    HGB 12.7 12/28/2024    HCT 35.4 (L) 12/28/2024    MCV 92 12/28/2024     12/28/2024       No results for input(s): "PT", "INR", "PROTIME", "APTT" in the last 72 hours.    Pre-op Assessment    I have reviewed the Patient Summary Reports.     I have reviewed the Nursing Notes. I have reviewed the NPO Status.   I have reviewed the Medications.     Review of Systems      Physical Exam  General: Alert and Oriented    Chest/Lungs:  Normal Respiratory Rate    Heart:  Rate: Normal        Anesthesia Plan  Type of Anesthesia, risks & benefits discussed:    Anesthesia Type: Gen ETT, Epidural, Spinal, CSE  Intra-op Monitoring Plan: Standard ASA Monitors  Post Op Pain Control Plan: multimodal analgesia  Induction:  IV  Airway Plan: Direct and Video, Post-Induction  Informed Consent: Informed consent signed with the Patient and all parties understand the risks and agree with anesthesia plan.  All questions answered.   ASA Score: 2  Day of Surgery Review of History & Physical: H&P Update referred to the surgeon/provider.    Ready For Surgery From Anesthesia Perspective.     .  "

## 2024-12-28 NOTE — Clinical Note
Transfer To (Destination): [facility] LABOR AND DELIVERY [[id number]]   Diagnosis: Transaminitis [569088]   Future Attending Provider: LALO BAIN [[id number]]

## 2024-12-29 LAB
ABO + RH BLD: NORMAL
ALBUMIN SERPL BCP-MCNC: 2.8 G/DL (ref 3.5–5.2)
ALBUMIN SERPL BCP-MCNC: 2.9 G/DL (ref 3.5–5.2)
ALP SERPL-CCNC: 243 U/L (ref 40–150)
ALP SERPL-CCNC: 252 U/L (ref 40–150)
ALT SERPL W/O P-5'-P-CCNC: 107 U/L (ref 10–44)
ALT SERPL W/O P-5'-P-CCNC: 90 U/L (ref 10–44)
ANION GAP SERPL CALC-SCNC: 12 MMOL/L (ref 8–16)
ANION GAP SERPL CALC-SCNC: 13 MMOL/L (ref 8–16)
AST SERPL-CCNC: 77 U/L (ref 10–40)
AST SERPL-CCNC: 95 U/L (ref 10–40)
BASOPHILS # BLD AUTO: 0.04 K/UL (ref 0–0.2)
BASOPHILS # BLD AUTO: 0.04 K/UL (ref 0–0.2)
BASOPHILS NFR BLD: 0.3 % (ref 0–1.9)
BASOPHILS NFR BLD: 0.4 % (ref 0–1.9)
BILIRUB SERPL-MCNC: 0.3 MG/DL (ref 0.1–1)
BILIRUB SERPL-MCNC: 0.3 MG/DL (ref 0.1–1)
BLD GP AB SCN CELLS X3 SERPL QL: NORMAL
BUN SERPL-MCNC: 10 MG/DL (ref 6–20)
BUN SERPL-MCNC: 12 MG/DL (ref 6–20)
CALCIUM SERPL-MCNC: 9 MG/DL (ref 8.7–10.5)
CALCIUM SERPL-MCNC: 9.2 MG/DL (ref 8.7–10.5)
CHLORIDE SERPL-SCNC: 107 MMOL/L (ref 95–110)
CHLORIDE SERPL-SCNC: 109 MMOL/L (ref 95–110)
CO2 SERPL-SCNC: 15 MMOL/L (ref 23–29)
CO2 SERPL-SCNC: 15 MMOL/L (ref 23–29)
CREAT SERPL-MCNC: 0.9 MG/DL (ref 0.5–1.4)
CREAT SERPL-MCNC: 0.9 MG/DL (ref 0.5–1.4)
DIFFERENTIAL METHOD BLD: ABNORMAL
DIFFERENTIAL METHOD BLD: ABNORMAL
EOSINOPHIL # BLD AUTO: 0 K/UL (ref 0–0.5)
EOSINOPHIL # BLD AUTO: 0.1 K/UL (ref 0–0.5)
EOSINOPHIL NFR BLD: 0.2 % (ref 0–8)
EOSINOPHIL NFR BLD: 0.7 % (ref 0–8)
ERYTHROCYTE [DISTWIDTH] IN BLOOD BY AUTOMATED COUNT: 13.2 % (ref 11.5–14.5)
ERYTHROCYTE [DISTWIDTH] IN BLOOD BY AUTOMATED COUNT: 13.2 % (ref 11.5–14.5)
EST. GFR  (NO RACE VARIABLE): >60 ML/MIN/1.73 M^2
EST. GFR  (NO RACE VARIABLE): >60 ML/MIN/1.73 M^2
GLUCOSE SERPL-MCNC: 76 MG/DL (ref 70–110)
GLUCOSE SERPL-MCNC: 78 MG/DL (ref 70–110)
HCT VFR BLD AUTO: 36.5 % (ref 37–48.5)
HCT VFR BLD AUTO: 37 % (ref 37–48.5)
HGB BLD-MCNC: 13.1 G/DL (ref 12–16)
HGB BLD-MCNC: 13.1 G/DL (ref 12–16)
HIV 1+2 AB+HIV1 P24 AG SERPL QL IA: NEGATIVE
IMM GRANULOCYTES # BLD AUTO: 0.09 K/UL (ref 0–0.04)
IMM GRANULOCYTES # BLD AUTO: 0.1 K/UL (ref 0–0.04)
IMM GRANULOCYTES NFR BLD AUTO: 0.6 % (ref 0–0.5)
IMM GRANULOCYTES NFR BLD AUTO: 1.1 % (ref 0–0.5)
LYMPHOCYTES # BLD AUTO: 2.1 K/UL (ref 1–4.8)
LYMPHOCYTES # BLD AUTO: 2.4 K/UL (ref 1–4.8)
LYMPHOCYTES NFR BLD: 16.6 % (ref 18–48)
LYMPHOCYTES NFR BLD: 22 % (ref 18–48)
MCH RBC QN AUTO: 32.9 PG (ref 27–31)
MCH RBC QN AUTO: 33.2 PG (ref 27–31)
MCHC RBC AUTO-ENTMCNC: 35.4 G/DL (ref 32–36)
MCHC RBC AUTO-ENTMCNC: 35.9 G/DL (ref 32–36)
MCV RBC AUTO: 92 FL (ref 82–98)
MCV RBC AUTO: 93 FL (ref 82–98)
MONOCYTES # BLD AUTO: 0.6 K/UL (ref 0.3–1)
MONOCYTES # BLD AUTO: 0.9 K/UL (ref 0.3–1)
MONOCYTES NFR BLD: 6.1 % (ref 4–15)
MONOCYTES NFR BLD: 6.2 % (ref 4–15)
NEUTROPHILS # BLD AUTO: 11.2 K/UL (ref 1.8–7.7)
NEUTROPHILS # BLD AUTO: 6.6 K/UL (ref 1.8–7.7)
NEUTROPHILS NFR BLD: 69.6 % (ref 38–73)
NEUTROPHILS NFR BLD: 76.2 % (ref 38–73)
NRBC BLD-RTO: 0 /100 WBC
NRBC BLD-RTO: 0 /100 WBC
OHS QRS DURATION: 82 MS
OHS QTC CALCULATION: 432 MS
PLATELET # BLD AUTO: 175 K/UL (ref 150–450)
PLATELET # BLD AUTO: 195 K/UL (ref 150–450)
PMV BLD AUTO: 11.4 FL (ref 9.2–12.9)
PMV BLD AUTO: 11.6 FL (ref 9.2–12.9)
POTASSIUM SERPL-SCNC: 4 MMOL/L (ref 3.5–5.1)
POTASSIUM SERPL-SCNC: 4.7 MMOL/L (ref 3.5–5.1)
PROT SERPL-MCNC: 6.6 G/DL (ref 6–8.4)
PROT SERPL-MCNC: 6.9 G/DL (ref 6–8.4)
RBC # BLD AUTO: 3.95 M/UL (ref 4–5.4)
RBC # BLD AUTO: 3.98 M/UL (ref 4–5.4)
SODIUM SERPL-SCNC: 135 MMOL/L (ref 136–145)
SODIUM SERPL-SCNC: 136 MMOL/L (ref 136–145)
SPECIMEN OUTDATE: NORMAL
WBC # BLD AUTO: 14.69 K/UL (ref 3.9–12.7)
WBC # BLD AUTO: 9.52 K/UL (ref 3.9–12.7)

## 2024-12-29 PROCEDURE — 59200 INSERT CERVICAL DILATOR: CPT

## 2024-12-29 PROCEDURE — 80053 COMPREHEN METABOLIC PANEL: CPT

## 2024-12-29 PROCEDURE — 85025 COMPLETE CBC W/AUTO DIFF WBC: CPT

## 2024-12-29 PROCEDURE — 87389 HIV-1 AG W/HIV-1&-2 AB AG IA: CPT

## 2024-12-29 PROCEDURE — 86900 BLOOD TYPING SEROLOGIC ABO: CPT | Performed by: STUDENT IN AN ORGANIZED HEALTH CARE EDUCATION/TRAINING PROGRAM

## 2024-12-29 PROCEDURE — 72100003 HC LABOR CARE, EA. ADDL. 8 HRS

## 2024-12-29 PROCEDURE — 11000001 HC ACUTE MED/SURG PRIVATE ROOM

## 2024-12-29 PROCEDURE — 72100002 HC LABOR CARE, 1ST 8 HOURS: Performed by: OBSTETRICS & GYNECOLOGY

## 2024-12-29 PROCEDURE — 85025 COMPLETE CBC W/AUTO DIFF WBC: CPT | Mod: 91

## 2024-12-29 PROCEDURE — 36415 COLL VENOUS BLD VENIPUNCTURE: CPT

## 2024-12-29 PROCEDURE — 25000003 PHARM REV CODE 250

## 2024-12-29 PROCEDURE — 63600175 PHARM REV CODE 636 W HCPCS

## 2024-12-29 PROCEDURE — 10907ZC DRAINAGE OF AMNIOTIC FLUID, THERAPEUTIC FROM PRODUCTS OF CONCEPTION, VIA NATURAL OR ARTIFICIAL OPENING: ICD-10-PCS | Performed by: OBSTETRICS & GYNECOLOGY

## 2024-12-29 PROCEDURE — 80053 COMPREHEN METABOLIC PANEL: CPT | Mod: 91

## 2024-12-29 RX ORDER — OXYTOCIN 10 [USP'U]/ML
10 INJECTION, SOLUTION INTRAMUSCULAR; INTRAVENOUS ONCE AS NEEDED
Status: COMPLETED | OUTPATIENT
Start: 2024-12-29 | End: 2024-12-30

## 2024-12-29 RX ORDER — LIDOCAINE HYDROCHLORIDE 10 MG/ML
10 INJECTION, SOLUTION INFILTRATION; PERINEURAL ONCE AS NEEDED
Status: DISCONTINUED | OUTPATIENT
Start: 2024-12-29 | End: 2024-12-30

## 2024-12-29 RX ORDER — FAMOTIDINE 20 MG/1
20 TABLET, FILM COATED ORAL 2 TIMES DAILY
Status: DISCONTINUED | OUTPATIENT
Start: 2024-12-29 | End: 2025-01-03 | Stop reason: HOSPADM

## 2024-12-29 RX ORDER — SODIUM CHLORIDE, SODIUM LACTATE, POTASSIUM CHLORIDE, CALCIUM CHLORIDE 600; 310; 30; 20 MG/100ML; MG/100ML; MG/100ML; MG/100ML
INJECTION, SOLUTION INTRAVENOUS CONTINUOUS
Status: DISCONTINUED | OUTPATIENT
Start: 2024-12-29 | End: 2024-12-30

## 2024-12-29 RX ORDER — SIMETHICONE 80 MG
1 TABLET,CHEWABLE ORAL 4 TIMES DAILY PRN
Status: DISCONTINUED | OUTPATIENT
Start: 2024-12-29 | End: 2024-12-30

## 2024-12-29 RX ORDER — LIDOCAINE HYDROCHLORIDE 20 MG/ML
15 SOLUTION OROPHARYNGEAL ONCE
Status: COMPLETED | OUTPATIENT
Start: 2024-12-29 | End: 2024-12-29

## 2024-12-29 RX ORDER — SODIUM CHLORIDE 9 MG/ML
INJECTION, SOLUTION INTRAVENOUS
Status: DISCONTINUED | OUTPATIENT
Start: 2024-12-29 | End: 2024-12-30

## 2024-12-29 RX ORDER — CALCIUM CARBONATE 200(500)MG
1000 TABLET,CHEWABLE ORAL 3 TIMES DAILY PRN
Status: DISCONTINUED | OUTPATIENT
Start: 2024-12-29 | End: 2024-12-29 | Stop reason: SDUPTHER

## 2024-12-29 RX ORDER — OXYTOCIN-SODIUM CHLORIDE 0.9% IV SOLN 30 UNIT/500ML 30-0.9/5 UT/ML-%
10 SOLUTION INTRAVENOUS ONCE AS NEEDED
Status: DISCONTINUED | OUTPATIENT
Start: 2024-12-29 | End: 2024-12-30

## 2024-12-29 RX ORDER — DIPHENOXYLATE HYDROCHLORIDE AND ATROPINE SULFATE 2.5; .025 MG/1; MG/1
2 TABLET ORAL EVERY 6 HOURS PRN
Status: DISCONTINUED | OUTPATIENT
Start: 2024-12-29 | End: 2024-12-30

## 2024-12-29 RX ORDER — METHYLERGONOVINE MALEATE 0.2 MG/ML
200 INJECTION INTRAVENOUS ONCE AS NEEDED
Status: DISCONTINUED | OUTPATIENT
Start: 2024-12-29 | End: 2024-12-30

## 2024-12-29 RX ORDER — PANTOPRAZOLE SODIUM 40 MG/1
40 TABLET, DELAYED RELEASE ORAL DAILY
Status: DISCONTINUED | OUTPATIENT
Start: 2024-12-29 | End: 2024-12-29

## 2024-12-29 RX ORDER — SODIUM CHLORIDE, SODIUM LACTATE, POTASSIUM CHLORIDE, CALCIUM CHLORIDE 600; 310; 30; 20 MG/100ML; MG/100ML; MG/100ML; MG/100ML
INJECTION, SOLUTION INTRAVENOUS CONTINUOUS
Status: DISCONTINUED | OUTPATIENT
Start: 2024-12-29 | End: 2025-01-01

## 2024-12-29 RX ORDER — ONDANSETRON 8 MG/1
8 TABLET, ORALLY DISINTEGRATING ORAL EVERY 8 HOURS PRN
Status: DISCONTINUED | OUTPATIENT
Start: 2024-12-29 | End: 2024-12-30

## 2024-12-29 RX ORDER — MAGNESIUM SULFATE HEPTAHYDRATE 40 MG/ML
1 INJECTION, SOLUTION INTRAVENOUS CONTINUOUS
Status: DISCONTINUED | OUTPATIENT
Start: 2024-12-29 | End: 2024-12-30

## 2024-12-29 RX ORDER — MISOPROSTOL 200 UG/1
800 TABLET ORAL ONCE AS NEEDED
Status: DISCONTINUED | OUTPATIENT
Start: 2024-12-29 | End: 2024-12-30

## 2024-12-29 RX ORDER — TRANEXAMIC ACID 10 MG/ML
1000 INJECTION, SOLUTION INTRAVENOUS EVERY 30 MIN PRN
Status: DISCONTINUED | OUTPATIENT
Start: 2024-12-29 | End: 2024-12-30

## 2024-12-29 RX ORDER — OXYTOCIN-SODIUM CHLORIDE 0.9% IV SOLN 30 UNIT/500ML 30-0.9/5 UT/ML-%
95 SOLUTION INTRAVENOUS CONTINUOUS PRN
Status: DISCONTINUED | OUTPATIENT
Start: 2024-12-29 | End: 2024-12-30

## 2024-12-29 RX ORDER — OXYTOCIN-SODIUM CHLORIDE 0.9% IV SOLN 30 UNIT/500ML 30-0.9/5 UT/ML-%
0-32 SOLUTION INTRAVENOUS CONTINUOUS
Status: DISCONTINUED | OUTPATIENT
Start: 2024-12-29 | End: 2024-12-30

## 2024-12-29 RX ORDER — MAGNESIUM SULFATE HEPTAHYDRATE 40 MG/ML
4 INJECTION, SOLUTION INTRAVENOUS ONCE
Status: COMPLETED | OUTPATIENT
Start: 2024-12-29 | End: 2024-12-29

## 2024-12-29 RX ORDER — CALCIUM CARBONATE 200(500)MG
500 TABLET,CHEWABLE ORAL 3 TIMES DAILY PRN
Status: DISCONTINUED | OUTPATIENT
Start: 2024-12-29 | End: 2024-12-30

## 2024-12-29 RX ORDER — CARBOPROST TROMETHAMINE 250 UG/ML
250 INJECTION, SOLUTION INTRAMUSCULAR
Status: DISCONTINUED | OUTPATIENT
Start: 2024-12-29 | End: 2024-12-30

## 2024-12-29 RX ORDER — ALUMINUM HYDROXIDE, MAGNESIUM HYDROXIDE, AND SIMETHICONE 1200; 120; 1200 MG/30ML; MG/30ML; MG/30ML
30 SUSPENSION ORAL ONCE
Status: COMPLETED | OUTPATIENT
Start: 2024-12-29 | End: 2024-12-29

## 2024-12-29 RX ORDER — OXYTOCIN-SODIUM CHLORIDE 0.9% IV SOLN 30 UNIT/500ML 30-0.9/5 UT/ML-%
95 SOLUTION INTRAVENOUS ONCE AS NEEDED
Status: DISCONTINUED | OUTPATIENT
Start: 2024-12-29 | End: 2024-12-30

## 2024-12-29 RX ORDER — CALCIUM GLUCONATE 98 MG/ML
1 INJECTION, SOLUTION INTRAVENOUS
Status: DISCONTINUED | OUTPATIENT
Start: 2024-12-29 | End: 2025-01-01

## 2024-12-29 RX ADMIN — FAMOTIDINE 20 MG: 20 TABLET ORAL at 05:12

## 2024-12-29 RX ADMIN — CALCIUM CARBONATE (ANTACID) CHEW TAB 500 MG 1000 MG: 500 CHEW TAB at 05:12

## 2024-12-29 RX ADMIN — LIDOCAINE HYDROCHLORIDE 15 ML: 20 SOLUTION ORAL at 09:12

## 2024-12-29 RX ADMIN — MISOPROSTOL 50 MCG: 100 TABLET ORAL at 09:12

## 2024-12-29 RX ADMIN — CALCIUM CARBONATE (ANTACID) CHEW TAB 500 MG 500 MG: 500 CHEW TAB at 04:12

## 2024-12-29 RX ADMIN — Medication 4 MILLI-UNITS/MIN: at 01:12

## 2024-12-29 RX ADMIN — DEXTROSE MONOHYDRATE 3 MILLION UNITS: 50 INJECTION, SOLUTION INTRAVENOUS at 12:12

## 2024-12-29 RX ADMIN — SODIUM CHLORIDE, POTASSIUM CHLORIDE, SODIUM LACTATE AND CALCIUM CHLORIDE: 600; 310; 30; 20 INJECTION, SOLUTION INTRAVENOUS at 07:12

## 2024-12-29 RX ADMIN — MAGNESIUM SULFATE HEPTAHYDRATE 4 G: 40 INJECTION, SOLUTION INTRAVENOUS at 07:12

## 2024-12-29 RX ADMIN — DEXTROSE MONOHYDRATE 3 MILLION UNITS: 50 INJECTION, SOLUTION INTRAVENOUS at 04:12

## 2024-12-29 RX ADMIN — MAGNESIUM SULFATE HEPTAHYDRATE 1 G/HR: 40 INJECTION, SOLUTION INTRAVENOUS at 07:12

## 2024-12-29 RX ADMIN — DEXTROSE MONOHYDRATE 3 MILLION UNITS: 50 INJECTION, SOLUTION INTRAVENOUS at 10:12

## 2024-12-29 RX ADMIN — DEXTROSE MONOHYDRATE 5 MILLION UNITS: 5 INJECTION INTRAVENOUS at 08:12

## 2024-12-29 RX ADMIN — FAMOTIDINE 20 MG: 20 TABLET ORAL at 09:12

## 2024-12-29 RX ADMIN — CALCIUM CARBONATE (ANTACID) CHEW TAB 500 MG 500 MG: 500 CHEW TAB at 09:12

## 2024-12-29 RX ADMIN — ALUMINUM HYDROXIDE, MAGNESIUM HYDROXIDE, AND SIMETHICONE 30 ML: 200; 200; 20 SUSPENSION ORAL at 09:12

## 2024-12-29 NOTE — PROGRESS NOTES
"LABOR NOTE    S:  Complaints: No.  Epidural working:  not applicable  Resident to bedside for routine cervical exam    O: /82   Pulse 83   Temp 98.5 °F (36.9 °C) (Oral)   Resp 18   Ht 5' 4" (1.626 m)   Wt 77.3 kg (170 lb 4.9 oz)   LMP 2024 (Approximate)   SpO2 98%   Breastfeeding No   BMI 29.23 kg/m²       FHT: 130 bpm, mod variability, + accels, - decels; Cat 1 (reassuring)  CTX: q 2-3 minutes, Pit @ 12  SVE: /-3      ASSESSMENT:   34 y.o.  at 36w2d, IOL 2/2 PreE w/ SF (LFTs)    FHT reassuring    Active Hospital Problems    Diagnosis  POA    *Transaminitis [R74.01]  Unknown    Multigravida of advanced maternal age in third trimester [O09.523]  Yes    Elevated blood pressure affecting pregnancy, antepartum [O16.9]  Yes      Resolved Hospital Problems   No resolved problems to display.     TIMELINE:   1215: Mccullough balloon replaced   1615: /-3; mccullough remains in place; pit @ 12      PLAN:    IOL:   Continue Close Maternal/Fetal Monitoring  Pitocin Augmentation per protocol  Recheck 2-4 hours or PRN    2. PreE w/ SF:   - BP: (100-155)/(57-92) 133/82   - Mag continued; No signs of toxicity   - Asymptomatic   - Cr 0.9   - AST/ALT 77/90   - Will continue to closely monitor     Gisell Maldonado MD  Obstetrics and Gynecology - PGY2   "

## 2024-12-29 NOTE — PROGRESS NOTES
"LABOR NOTE    S:  Complaints: No.  Epidural working:  not applicable  Resident to bedside to replace mccullough balloon.     O: BP (!) 155/92   Pulse 87   Temp 98 °F (36.7 °C) (Oral)   Resp 19   Ht 5' 4" (1.626 m)   Wt 77.3 kg (170 lb 4.9 oz)   LMP 2024 (Approximate)   SpO2 99%   Breastfeeding No   BMI 29.23 kg/m²       FHT: 130 bpm, mod variability, + accels, - decels; Cat 1 (reassuring)  CTX: q 2-3 minutes, previously received cytotec   SVE: 50/-3, mccullough balloon replaced.       ASSESSMENT:   34 y.o.  at 36w2d, IOL 2/2 PreE w/ SF (LFTs)    FHT reassuring    Active Hospital Problems    Diagnosis  POA    *Transaminitis [R74.01]  Unknown    Multigravida of advanced maternal age in third trimester [O09.523]  Yes    Elevated blood pressure affecting pregnancy, antepartum [O16.9]  Yes      Resolved Hospital Problems   No resolved problems to display.     TIMELINE:   1215: Mccullough balloon replaced       PLAN:    IOL:   Continue Close Maternal/Fetal Monitoring  Pitocin Augmentation per protocol  Recheck 2 hours or PRN    2. PreE w/ SF:   - BP: (116-155)/(78-92) 155/92    - Mag continued; No signs of toxicity   - Asymptomatic   - Cr 0.9   - AST/ALT 77/90   - Will continue to closely monitor     Marilyn Jhaveri MD/MPH  OB/GYN PGY-4  [facility]    "

## 2024-12-29 NOTE — NURSING
RN called to BS by pt. Moderate amount of dark red blood noted on pads post cervical ripening balloon placement. MD Joel to BS to assess bleeding. SVE performed. Pt denies constant pain, endorses pain 2/10 with ctx. Care plan continued.

## 2024-12-29 NOTE — CARE UPDATE
Resident to bedside to speak with patient regarding AM labs.    Pt reports feeling well this morning. Endorses understanding that her elevated LFTs have put her into criteria for PreE w/ SF (LFTs), and that she meets criteria for induction of labor. Pt agreeable to plan. All questions answered at bedside.     Temp:  [97.9 °F (36.6 °C)-98.6 °F (37 °C)] 98.6 °F (37 °C)  Pulse:  [] 82  Resp:  [16-19] 16  SpO2:  [97 %-99 %] 98 %  BP: (116-142)/(78-92) 137/89    Recent Labs   Lab 12/28/24  1335 12/28/24 2008 12/29/24  0549   WBC 12.78* 10.88 9.52   HGB 12.7 11.9* 13.1   HCT 35.4* 33.7* 36.5*   MCV 92 92 92    171 175      Recent Labs   Lab 12/28/24  1335 12/28/24 2008 12/29/24  0549   * 136 135*   K 3.8 3.7 4.0    108 107   CO2 15* 18* 15*   BUN 12 10 12   CREATININE 0.9 1.0 0.9   GLU 83 96 78   PROT 6.9 6.3 6.6   BILITOT 0.2 0.2 0.3   ALKPHOS 251* 235* 243*   ALT 83* 84* 90*   AST 66* 67* 77*        PE:  General: well appearing, NAD  Card: RRR  Resp: Breathing comfortably on RA  : deferred        Plan:    PreE w/SF (LFT)   - BP as above  - asymptomatic  - preE labs as above  - UOP: strict I&o ordered   - Mag: continue  - Hypertensive agent: N/A      IOL:  -plan for initiation of IOL when  returns per pt request   - Consents signed and to chart  - Bedside ultrasound performed, vertex   - Plan for SVE and cervical mccullough balloon at next check with cytotec administration     AMA  -PP lovenox not indicated   -PP ambulation encouraged       Trina Mcfadden MD (Mary)   Obstetrics and Gynecology, PGY1

## 2024-12-29 NOTE — PROGRESS NOTES
[facility] OBGYN  Progress Note          Subjective:    Leanna Yee is a 34 y.o.  at 36w2d admitted for transaminitis and elevated BP. Please see H&P for details regarding presentation at admission. This pregnancy is complicated by AMA.    Interim HPI:   Doing well this morning, resting comfortably in bed. Denies HA, vision changes, CP, SOB, abdominal pain, back pain, f/c, n/v, ctx, VB, LOF. Normal FM. She reports return of heartburn and acid reflux with associated belching. States the pepcid last night relieved her symptoms until this morning, desires another dose.     Medical, Surgical, Social, Family, and Obstetric History: reviewed in chart  Current Medications:    aspirin  81 mg Oral Daily    famotidine  20 mg Oral BID    pantoprazole  40 mg Oral Daily    prenatal vitamin  1 tablet Oral Daily      Current Facility-Administered Medications:     calcium carbonate, 1,000 mg, Oral, TID PRN    diphenhydrAMINE, 25 mg, Oral, Q4H PRN    diphenhydrAMINE, 25 mg, Intravenous, Q4H PRN    ondansetron, 8 mg, Oral, Q8H PRN    senna-docusate 8.6-50 mg, 1 tablet, Oral, Nightly PRN    simethicone, 1 tablet, Oral, Q6H PRN    sodium chloride 0.9%, 10 mL, Intravenous, PRN   Objective:   BP (!) 142/90   Pulse 77   Temp 97.9 °F (36.6 °C) (Oral)   Resp 16   LMP 2024 (Approximate)   SpO2 98%   Breastfeeding No     Focused Physical Examination   General: well developed, no acute distress  Pulmonary: respiratory effort normal with no retractions  Abdomen: gravid    Lab Results  Recent Labs   Lab 24  1335 24   WBC 12.78* 10.88   HGB 12.7 11.9*   HCT 35.4* 33.7*   MCV 92 92    171      Recent Labs   Lab 24  1335 24   * 136   K 3.8 3.7    108   CO2 15* 18*   BUN 12 10   CREATININE 0.9 1.0   GLU 83 96   PROT 6.9 6.3   BILITOT 0.2 0.2   ALKPHOS 251* 235*   ALT 83* 84*   AST 66* 67*        Assessment:   34 y.o.  at 36w2d admitted for transaminitis,  elevated BP   Plan:     Transaminitis, Elevated BP  - denies preE sx this AM, only complaint is heartburn/acid reflux  - Normal to mild range diastolic blood pressures since admission  - PreE labs trending as above, elevated ALT and slightly downtrending platelets  - patient now meets criteria for at least gHTN given mild range BP >4h apart  - repeat CBC and CMP pending this AM  - unclear if transaminitis is associated with evoloving preE w/ SF or if attributed to other etiology such as 2/2 vomitting as patient endorses excessive emesis the night prior to presentation  - continue close BP monitoring   - NST BID  - Regular diet  - Will avoid hepatotoxic agents     GERD  - Pepcid BID, pantoprazole qD ordered  - TUMS PRN     AMA  - Continue ASA 81mg qD

## 2024-12-29 NOTE — CARE UPDATE
To bedside to reassess patient. Doing well, no complaints at this time. Denies HA, vision changes, CP, SOB, abdominal pain, n/v, f/c. Denies contractions, VB, LOF. Normal FM. BP has been normal range since admission. Repeat labs s/p 6h resulted as follows:   Recent Labs   Lab 12/28/24  1335 12/28/24 2008   WBC 12.78* 10.88   HGB 12.7 11.9*   HCT 35.4* 33.7*   MCV 92 92    171      Recent Labs   Lab 12/28/24  1335 12/28/24 2008   * 136   K 3.8 3.7    108   CO2 15* 18*   BUN 12 10   CREATININE 0.9 1.0   GLU 83 96   PROT 6.9 6.3   BILITOT 0.2 0.2   ALKPHOS 251* 235*   ALT 83* 84*   AST 66* 67*      Overall stable labs. Recommend continued observation overnight with repeat labs in AM and close BP monitoring. Patient verbalizes understanding. All questions from patient and family member answered at this time.

## 2024-12-29 NOTE — CARE UPDATE
"LABOR NOTE    S:  Complaints: No.  Epidural working:  not applicable  Resident to bedside for mccullough balloon placement.    O: /89   Pulse 82   Temp 98.6 °F (37 °C) (Oral)   Resp 16   Ht 5' 4" (1.626 m)   Wt 77.3 kg (170 lb 4.9 oz)   LMP 03/19/2024 (Approximate)   SpO2 98%   Breastfeeding No   BMI 29.23 kg/m²     FHT: 140bpm; moderate variability; +accels/-decels; Cat 1 (reassuring)  CTX: q 2-3 minutes  SVE: 1/50/-3; mccullough placed    Recent Labs   Lab 12/28/24  1335 12/28/24  1451 12/28/24 2008 12/29/24  0549   WBC 12.78*  --  10.88 9.52   HGB 12.7  --  11.9* 13.1   HCT 35.4*  --  33.7* 36.5*     --  171 175   BUN 12  --  10 12   CREATININE 0.9  --  1.0 0.9   ALT 83*  --  84* 90*   AST 66*  --  67* 77*   UTPCR  --  0.12  --   --         Plan:    PreE w/SF (LFT)   - BP as above  - asymptomatic  - preE labs as above  - UOP: strict I&o ordered   - Mag: continue  - Hypertensive agent: N/A    IOL:  - Consents signed and to chart  - Bedside ultrasound performed, vertex   - Cytotec x1 placed  - Mccullough balloon placed    AMA  -PP lovenox not indicated   -PP ambulation encouraged     Gisell Maldonado MD  Obstetrics and Gynecology - PGY2   "

## 2024-12-29 NOTE — PLAN OF CARE
Problem:  Fall Injury Risk  Goal: Absence of Fall, Infant Drop and Related Injury  Reactivated     Problem: Adult Inpatient Plan of Care  Goal: Plan of Care Review  Reactivated  Goal: Patient-Specific Goal (Individualized)  Reactivated  Goal: Absence of Hospital-Acquired Illness or Injury  Reactivated  Goal: Optimal Comfort and Wellbeing  Reactivated  Goal: Readiness for Transition of Care  Reactivated      Pt conscious and coherent with no pain reported, has 1 episode of mild range BP and the rest were WNL.   NST done as ordered with mod variability (+) accels CAT 1 and with ctxns from toco but pt denies ctxns.   Liver enzyme monitored closely, MD aware of the result.    Latest Reference Range & Units 24 20:08 24 05:49   AST 10 - 40 U/L 67 (H) 77 (H)   ALT 10 - 44 U/L 84 (H) 90 (H)   (H): Data is abnormally high    Pt needs attended. Pt safety maintained.

## 2024-12-29 NOTE — H&P
[facility]   Obstetrics  History & Physical    Patient Name: Leanna Yee  MRN: [id number]  Admission Date: 2024  Primary Care Provider: Lauren, Primary Doctor    Subjective:     Principal Problem:Transaminitis    History of Present Illness:     Leanna Yee is a 34 y.o.  at 36w1d who presents to the OB ED today (2024) with CC of elevated blood pressures headache and GERD/chest pain.      Patient noted elevated pressures on her at-home cuff today which was accompanied by a headache rated at about 3-4/10. This feels very similar to prior migraines. Patient has a long history of migraines for which she takes Excedrin while not pregnant. She has not tried anything for pain yet with this headache. No changes in vision.      She also reports some chest pain which she believes is due to acid reflux. It is burning and central. It is similar to prior episodes of heartburn, however, slightly different. Patient denies fevers or chills.      She reports no vaginal bleeding, no leakage of fluid and no contractions. She reports good fetal movement.     This pregnancy is complicated by AMA, low lying placenta, now resolved.    OB History    Para Term  AB Living   1 0 0 0 0 0   SAB IAB Ectopic Multiple Live Births   0 0 0 0 0      # Outcome Date GA Lbr Bashir/2nd Weight Sex Type Anes PTL Lv   1 Current              No past medical history on file.  Past Surgical History:   Procedure Laterality Date    APPENDECTOMY         PTA Medications   Medication Sig    aspirin (ECOTRIN) 81 MG EC tablet Take 81 mg by mouth once daily. Once daily in the AM.    clindamycin-benzoyl peroxide gel Apply topically.    famotidine (PEPCID) 10 MG tablet Take 10 mg by mouth 2 (two) times daily.    folic acid (FOLVITE) 800 MCG Tab Take 800 mcg by mouth once daily.    loratadine (CLARITIN) 5 mg chewable tablet Take 5 mg by mouth as needed for Allergies. Taking Q 6 to 8 hours PRN for headaches.    prenatal  25/iron/folate 6/dha (PRENA1 ORAL) Take by mouth.       Review of patient's allergies indicates:   Allergen Reactions    Prochlorperazine Other (See Comments)     Lost control of neck and eye muscles        Family History       Problem Relation (Age of Onset)    Heart disease Father          Tobacco Use    Smoking status: Never     Passive exposure: Never    Smokeless tobacco: Never   Substance and Sexual Activity    Alcohol use: Yes     Comment: socially    Drug use: Never    Sexual activity: Yes     Partners: Male     Birth control/protection: None       Objective:     Vital Signs (Most Recent):  Temp: 98.4 °F (36.9 °C) (12/28/24 1500)  Pulse: 106 (12/28/24 1632)  Resp: 16 (12/28/24 1500)  BP: 118/88 (12/28/24 1631)  SpO2: 97 % (12/28/24 1541) Vital Signs (24h Range):  Temp:  [98.3 °F (36.8 °C)-98.4 °F (36.9 °C)] 98.4 °F (36.9 °C)  Pulse:  [] 106  Resp:  [16-19] 16  SpO2:  [97 %-99 %] 97 %  BP: (116-137)/(78-92) 118/88        There is no height or weight on file to calculate BMI.    FHT: 135bpm, mod aramis, +accels, -decels   TOCO:  Uterine irritability    Physical Exam:   Constitutional: She is oriented to person, place, and time. She appears well-developed and well-nourished. No distress.    HENT:   Head: Normocephalic and atraumatic.    Eyes: EOM are normal.    Neck: No JVD present.    Cardiovascular:  Normal rate and regular rhythm.             Pulmonary/Chest: Effort normal and breath sounds normal. No respiratory distress. She has no wheezes.        Abdominal: There is no abdominal tenderness.   Gravid uterus             Musculoskeletal: Moves all extremeties. No edema.       Neurological: She is alert and oriented to person, place, and time.    Skin: Skin is warm and dry.    Psychiatric: She has a normal mood and affect. Her behavior is normal.     Significant Labs:  Lab Results   Component Value Date    GROUPTRH O POS 06/28/2024    HEPBSAG Non-reactive 06/28/2024       Recent Labs   Lab 12/28/24  4155    WBC 12.78*   HGB 12.7   HCT 35.4*   MCV 92         Recent Labs   Lab 24  1335   *   K 3.8      CO2 15*   BUN 12   CREATININE 0.9   GLU 83   PROT 6.9   BILITOT 0.2   ALKPHOS 251*   ALT 83*   AST 66*      Lab Results   Component Value Date    HGB 12.7 2024    HCT 35.4 (L) 2024     2024    CREATININE 0.9 2024    AST 66 (H) 2024    ALT 83 (H) 2024    UTPCR 0.12 2024         Assessment/Plan:     34 y.o. female  at 36w1d for:    Active Diagnoses:    Diagnosis Date Noted POA    PRINCIPAL PROBLEM:  Transaminitis [R74.01] 2024 Unknown    Multigravida of advanced maternal age in third trimester [O09.523] 2024 Yes    Elevated blood pressure affecting pregnancy, antepartum [O16.9] 2024 Yes      Problems Resolved During this Admission:       Transaminitis, Elevated BP  - Normal to mild range diastolic blood pressures in [place]; no documented h/o elevated BP in chart review  - Mild HA improved with tylenol and reglan/benadryl  - Exam unremarkable   - EKG NSR  - PreE labs as above, significant for AST/ALT 66/83 and Cr 0.9 (baseline 0.7)  - Admit for observation given new finding of elevated BP and transaminitis  - Repeat preE labs in 6h  - Vitals q4h or PRN  - EFM has been R&R in [place]; NST BID  - Regular diet  - Will avoid hepatotoxic agents    GERD  - S/p bicitra in [place] with resolution of chest burning/discomfort  - Pepcid BID ordered    AMA  - Continue ASA 81mg qD    Karine Mendoza MD  OBGYN   PGY-1

## 2024-12-29 NOTE — CARE UPDATE
PM NST    FHT: 130bpm, mod aramis, +accels, -decels  TOCO: q 8-10 mins (pt asymptomatic)    Continue NST BID.    Donita Polo MD PGY2  Obstetrics and Gynecology

## 2024-12-30 PROBLEM — Z98.891 STATUS POST CESAREAN DELIVERY: Status: ACTIVE | Noted: 2024-12-30

## 2024-12-30 PROBLEM — O14.93 PRE-ECLAMPSIA IN THIRD TRIMESTER: Status: ACTIVE | Noted: 2024-12-30

## 2024-12-30 LAB
ALBUMIN SERPL BCP-MCNC: 2.6 G/DL (ref 3.5–5.2)
ALP SERPL-CCNC: 230 U/L (ref 40–150)
ALT SERPL W/O P-5'-P-CCNC: 96 U/L (ref 10–44)
ANION GAP SERPL CALC-SCNC: 9 MMOL/L (ref 8–16)
AST SERPL-CCNC: 80 U/L (ref 10–40)
BILIRUB SERPL-MCNC: 0.4 MG/DL (ref 0.1–1)
BUN SERPL-MCNC: 8 MG/DL (ref 6–20)
CALCIUM SERPL-MCNC: 8.4 MG/DL (ref 8.7–10.5)
CHLORIDE SERPL-SCNC: 107 MMOL/L (ref 95–110)
CO2 SERPL-SCNC: 17 MMOL/L (ref 23–29)
CREAT SERPL-MCNC: 1 MG/DL (ref 0.5–1.4)
EST. GFR  (NO RACE VARIABLE): >60 ML/MIN/1.73 M^2
GLUCOSE SERPL-MCNC: 83 MG/DL (ref 70–110)
MAGNESIUM SERPL-MCNC: 4.9 MG/DL (ref 1.6–2.6)
POTASSIUM SERPL-SCNC: 4 MMOL/L (ref 3.5–5.1)
PROT SERPL-MCNC: 6.2 G/DL (ref 6–8.4)
SODIUM SERPL-SCNC: 133 MMOL/L (ref 136–145)

## 2024-12-30 PROCEDURE — 36415 COLL VENOUS BLD VENIPUNCTURE: CPT

## 2024-12-30 PROCEDURE — 37000009 HC ANESTHESIA EA ADD 15 MINS: Performed by: OBSTETRICS & GYNECOLOGY

## 2024-12-30 PROCEDURE — 71000033 HC RECOVERY, INTIAL HOUR: Performed by: OBSTETRICS & GYNECOLOGY

## 2024-12-30 PROCEDURE — 59510 CESAREAN DELIVERY: CPT | Mod: ,,, | Performed by: OBSTETRICS & GYNECOLOGY

## 2024-12-30 PROCEDURE — 25000003 PHARM REV CODE 250

## 2024-12-30 PROCEDURE — 37000008 HC ANESTHESIA 1ST 15 MINUTES: Performed by: OBSTETRICS & GYNECOLOGY

## 2024-12-30 PROCEDURE — 63600175 PHARM REV CODE 636 W HCPCS

## 2024-12-30 PROCEDURE — 63600175 PHARM REV CODE 636 W HCPCS: Mod: JZ,JG

## 2024-12-30 PROCEDURE — 36004724 HC OB OR TIME LEV III - 1ST 15 MIN: Performed by: OBSTETRICS & GYNECOLOGY

## 2024-12-30 PROCEDURE — 72100003 HC LABOR CARE, EA. ADDL. 8 HRS

## 2024-12-30 PROCEDURE — 80053 COMPREHEN METABOLIC PANEL: CPT

## 2024-12-30 PROCEDURE — 71000039 HC RECOVERY, EACH ADD'L HOUR: Performed by: OBSTETRICS & GYNECOLOGY

## 2024-12-30 PROCEDURE — 83735 ASSAY OF MAGNESIUM: CPT

## 2024-12-30 PROCEDURE — C1751 CATH, INF, PER/CENT/MIDLINE: HCPCS | Performed by: ANESTHESIOLOGY

## 2024-12-30 PROCEDURE — 36004725 HC OB OR TIME LEV III - EA ADD 15 MIN: Performed by: OBSTETRICS & GYNECOLOGY

## 2024-12-30 PROCEDURE — 27200710 HC EPIDURAL INFUSION PUMP SET: Performed by: ANESTHESIOLOGY

## 2024-12-30 PROCEDURE — 62326 NJX INTERLAMINAR LMBR/SAC: CPT

## 2024-12-30 PROCEDURE — 11000001 HC ACUTE MED/SURG PRIVATE ROOM

## 2024-12-30 RX ORDER — ADHESIVE BANDAGE
30 BANDAGE TOPICAL 2 TIMES DAILY PRN
Status: DISCONTINUED | OUTPATIENT
Start: 2024-12-31 | End: 2025-01-03 | Stop reason: HOSPADM

## 2024-12-30 RX ORDER — LIDOCAINE HYDROCHLORIDE AND EPINEPHRINE 15; 5 MG/ML; UG/ML
INJECTION, SOLUTION EPIDURAL
Status: DISCONTINUED | OUTPATIENT
Start: 2024-12-30 | End: 2024-12-30

## 2024-12-30 RX ORDER — SODIUM CHLORIDE 0.9 % (FLUSH) 0.9 %
10 SYRINGE (ML) INJECTION
Status: DISCONTINUED | OUTPATIENT
Start: 2024-12-30 | End: 2025-01-03 | Stop reason: HOSPADM

## 2024-12-30 RX ORDER — ACETAMINOPHEN 500 MG
1000 TABLET ORAL ONCE
Status: COMPLETED | OUTPATIENT
Start: 2024-12-30 | End: 2024-12-30

## 2024-12-30 RX ORDER — SODIUM CITRATE AND CITRIC ACID MONOHYDRATE 334; 500 MG/5ML; MG/5ML
SOLUTION ORAL
Status: COMPLETED
Start: 2024-12-30 | End: 2024-12-30

## 2024-12-30 RX ORDER — ACETAMINOPHEN 325 MG/1
650 TABLET ORAL EVERY 6 HOURS
Status: DISCONTINUED | OUTPATIENT
Start: 2024-12-31 | End: 2025-01-01

## 2024-12-30 RX ORDER — TRANEXAMIC ACID 100 MG/ML
INJECTION, SOLUTION INTRAVENOUS
Status: DISCONTINUED | OUTPATIENT
Start: 2024-12-30 | End: 2024-12-30

## 2024-12-30 RX ORDER — MISOPROSTOL 200 UG/1
800 TABLET ORAL ONCE AS NEEDED
Status: DISCONTINUED | OUTPATIENT
Start: 2024-12-30 | End: 2025-01-03 | Stop reason: HOSPADM

## 2024-12-30 RX ORDER — OXYCODONE HYDROCHLORIDE 10 MG/1
10 TABLET ORAL EVERY 4 HOURS PRN
Status: DISCONTINUED | OUTPATIENT
Start: 2024-12-30 | End: 2025-01-03 | Stop reason: HOSPADM

## 2024-12-30 RX ORDER — KETOROLAC TROMETHAMINE 30 MG/ML
30 INJECTION, SOLUTION INTRAMUSCULAR; INTRAVENOUS EVERY 6 HOURS
Status: DISCONTINUED | OUTPATIENT
Start: 2024-12-31 | End: 2024-12-31

## 2024-12-30 RX ORDER — METHYLERGONOVINE MALEATE 0.2 MG/ML
200 INJECTION INTRAVENOUS ONCE AS NEEDED
Status: DISCONTINUED | OUTPATIENT
Start: 2024-12-30 | End: 2025-01-03 | Stop reason: HOSPADM

## 2024-12-30 RX ORDER — FENTANYL/BUPIVACAINE/NS/PF 2MCG/ML-.1
PLASTIC BAG, INJECTION (ML) INJECTION
Status: DISCONTINUED | OUTPATIENT
Start: 2024-12-30 | End: 2024-12-30

## 2024-12-30 RX ORDER — ENOXAPARIN SODIUM 100 MG/ML
40 INJECTION SUBCUTANEOUS EVERY 24 HOURS
Status: DISCONTINUED | OUTPATIENT
Start: 2024-12-31 | End: 2025-01-03 | Stop reason: HOSPADM

## 2024-12-30 RX ORDER — AZITHROMYCIN MONOHYDRATE 500 MG/5ML
INJECTION, POWDER, LYOPHILIZED, FOR SOLUTION INTRAVENOUS
Status: DISPENSED
Start: 2024-12-30 | End: 2024-12-31

## 2024-12-30 RX ORDER — FAMOTIDINE 10 MG/ML
20 INJECTION INTRAVENOUS
Status: COMPLETED | OUTPATIENT
Start: 2024-12-30 | End: 2024-12-30

## 2024-12-30 RX ORDER — ONDANSETRON HYDROCHLORIDE 2 MG/ML
4 INJECTION, SOLUTION INTRAVENOUS EVERY 6 HOURS PRN
Status: DISCONTINUED | OUTPATIENT
Start: 2024-12-30 | End: 2024-12-31

## 2024-12-30 RX ORDER — BISACODYL 10 MG/1
10 SUPPOSITORY RECTAL ONCE AS NEEDED
Status: DISCONTINUED | OUTPATIENT
Start: 2024-12-30 | End: 2025-01-03 | Stop reason: HOSPADM

## 2024-12-30 RX ORDER — FAMOTIDINE 10 MG/ML
INJECTION INTRAVENOUS
Status: COMPLETED
Start: 2024-12-30 | End: 2024-12-30

## 2024-12-30 RX ORDER — FENTANYL/BUPIVACAINE/NS/PF 2MCG/ML-.1
PLASTIC BAG, INJECTION (ML) INJECTION
Status: COMPLETED
Start: 2024-12-30 | End: 2024-12-30

## 2024-12-30 RX ORDER — OXYCODONE HYDROCHLORIDE 10 MG/1
10 TABLET ORAL EVERY 4 HOURS PRN
Status: DISCONTINUED | OUTPATIENT
Start: 2024-12-30 | End: 2024-12-30

## 2024-12-30 RX ORDER — DOCUSATE SODIUM 100 MG/1
200 CAPSULE, LIQUID FILLED ORAL 2 TIMES DAILY
Status: DISCONTINUED | OUTPATIENT
Start: 2024-12-30 | End: 2025-01-03 | Stop reason: HOSPADM

## 2024-12-30 RX ORDER — ACETAMINOPHEN 325 MG/1
650 TABLET ORAL EVERY 6 HOURS
Status: DISCONTINUED | OUTPATIENT
Start: 2024-12-31 | End: 2024-12-30

## 2024-12-30 RX ORDER — OXYTOCIN-SODIUM CHLORIDE 0.9% IV SOLN 30 UNIT/500ML 30-0.9/5 UT/ML-%
95 SOLUTION INTRAVENOUS ONCE AS NEEDED
Status: DISCONTINUED | OUTPATIENT
Start: 2024-12-30 | End: 2025-01-03 | Stop reason: HOSPADM

## 2024-12-30 RX ORDER — NALBUPHINE HYDROCHLORIDE 10 MG/ML
2.5 INJECTION INTRAMUSCULAR; INTRAVENOUS; SUBCUTANEOUS ONCE AS NEEDED
Status: DISCONTINUED | OUTPATIENT
Start: 2024-12-30 | End: 2024-12-30

## 2024-12-30 RX ORDER — OXYTOCIN-SODIUM CHLORIDE 0.9% IV SOLN 30 UNIT/500ML 30-0.9/5 UT/ML-%
10 SOLUTION INTRAVENOUS ONCE AS NEEDED
Status: DISCONTINUED | OUTPATIENT
Start: 2024-12-30 | End: 2025-01-03 | Stop reason: HOSPADM

## 2024-12-30 RX ORDER — SIMETHICONE 80 MG
1 TABLET,CHEWABLE ORAL EVERY 6 HOURS PRN
Status: DISCONTINUED | OUTPATIENT
Start: 2024-12-30 | End: 2025-01-02

## 2024-12-30 RX ORDER — AMOXICILLIN 250 MG
1 CAPSULE ORAL NIGHTLY PRN
Status: DISCONTINUED | OUTPATIENT
Start: 2024-12-30 | End: 2025-01-03 | Stop reason: HOSPADM

## 2024-12-30 RX ORDER — PROCHLORPERAZINE EDISYLATE 5 MG/ML
5 INJECTION INTRAMUSCULAR; INTRAVENOUS EVERY 6 HOURS PRN
Status: DISCONTINUED | OUTPATIENT
Start: 2024-12-30 | End: 2024-12-31

## 2024-12-30 RX ORDER — TRANEXAMIC ACID 10 MG/ML
1000 INJECTION, SOLUTION INTRAVENOUS EVERY 30 MIN PRN
Status: DISCONTINUED | OUTPATIENT
Start: 2024-12-30 | End: 2025-01-03 | Stop reason: HOSPADM

## 2024-12-30 RX ORDER — PRENATAL WITH FERROUS FUM AND FOLIC ACID 3080; 920; 120; 400; 22; 1.84; 3; 20; 10; 1; 12; 200; 27; 25; 2 [IU]/1; [IU]/1; MG/1; [IU]/1; MG/1; MG/1; MG/1; MG/1; MG/1; MG/1; UG/1; MG/1; MG/1; MG/1; MG/1
1 TABLET ORAL DAILY
Status: DISCONTINUED | OUTPATIENT
Start: 2024-12-31 | End: 2025-01-03 | Stop reason: HOSPADM

## 2024-12-30 RX ORDER — ACETAMINOPHEN 10 MG/ML
INJECTION, SOLUTION INTRAVENOUS
Status: DISCONTINUED | OUTPATIENT
Start: 2024-12-30 | End: 2024-12-30

## 2024-12-30 RX ORDER — DIPHENHYDRAMINE HYDROCHLORIDE 50 MG/ML
25 INJECTION INTRAMUSCULAR; INTRAVENOUS ONCE
Status: DISCONTINUED | OUTPATIENT
Start: 2024-12-30 | End: 2024-12-30

## 2024-12-30 RX ORDER — FENTANYL/BUPIVACAINE/NS/PF 2MCG/ML-.1
PLASTIC BAG, INJECTION (ML) INJECTION CONTINUOUS
Status: DISCONTINUED | OUTPATIENT
Start: 2024-12-30 | End: 2024-12-30

## 2024-12-30 RX ORDER — MAGNESIUM SULFATE HEPTAHYDRATE 40 MG/ML
1 INJECTION, SOLUTION INTRAVENOUS CONTINUOUS
Status: ACTIVE | OUTPATIENT
Start: 2024-12-30 | End: 2024-12-31

## 2024-12-30 RX ORDER — SODIUM CITRATE AND CITRIC ACID MONOHYDRATE 334; 500 MG/5ML; MG/5ML
30 SOLUTION ORAL
Status: COMPLETED | OUTPATIENT
Start: 2024-12-30 | End: 2024-12-30

## 2024-12-30 RX ORDER — DIPHENHYDRAMINE HCL 25 MG
25 CAPSULE ORAL EVERY 4 HOURS PRN
Status: DISCONTINUED | OUTPATIENT
Start: 2024-12-30 | End: 2025-01-03 | Stop reason: HOSPADM

## 2024-12-30 RX ORDER — OXYTOCIN 10 [USP'U]/ML
10 INJECTION, SOLUTION INTRAMUSCULAR; INTRAVENOUS ONCE AS NEEDED
Status: DISCONTINUED | OUTPATIENT
Start: 2024-12-30 | End: 2025-01-03 | Stop reason: HOSPADM

## 2024-12-30 RX ORDER — SODIUM CHLORIDE, SODIUM LACTATE, POTASSIUM CHLORIDE, CALCIUM CHLORIDE 600; 310; 30; 20 MG/100ML; MG/100ML; MG/100ML; MG/100ML
INJECTION, SOLUTION INTRAVENOUS CONTINUOUS PRN
Status: DISCONTINUED | OUTPATIENT
Start: 2024-12-30 | End: 2024-12-30

## 2024-12-30 RX ORDER — EPHEDRINE SULFATE 50 MG/ML
INJECTION, SOLUTION INTRAVENOUS
Status: DISCONTINUED | OUTPATIENT
Start: 2024-12-30 | End: 2024-12-30

## 2024-12-30 RX ORDER — DIPHENOXYLATE HYDROCHLORIDE AND ATROPINE SULFATE 2.5; .025 MG/1; MG/1
2 TABLET ORAL EVERY 6 HOURS PRN
Status: DISCONTINUED | OUTPATIENT
Start: 2024-12-30 | End: 2025-01-03 | Stop reason: HOSPADM

## 2024-12-30 RX ORDER — ONDANSETRON 8 MG/1
8 TABLET, ORALLY DISINTEGRATING ORAL EVERY 8 HOURS PRN
Status: DISCONTINUED | OUTPATIENT
Start: 2024-12-30 | End: 2025-01-03 | Stop reason: HOSPADM

## 2024-12-30 RX ORDER — LIDOCAINE HCL/EPINEPHRINE/PF 2%-1:200K
VIAL (ML) INJECTION
Status: DISCONTINUED | OUTPATIENT
Start: 2024-12-30 | End: 2024-12-30

## 2024-12-30 RX ORDER — OXYCODONE HYDROCHLORIDE 5 MG/1
5 TABLET ORAL EVERY 4 HOURS PRN
Status: DISCONTINUED | OUTPATIENT
Start: 2024-12-30 | End: 2024-12-30

## 2024-12-30 RX ORDER — PHENYLEPHRINE HYDROCHLORIDE 10 MG/ML
INJECTION INTRAVENOUS
Status: DISCONTINUED | OUTPATIENT
Start: 2024-12-30 | End: 2024-12-30

## 2024-12-30 RX ORDER — FENTANYL CITRATE 50 UG/ML
INJECTION, SOLUTION INTRAMUSCULAR; INTRAVENOUS
Status: DISCONTINUED | OUTPATIENT
Start: 2024-12-30 | End: 2024-12-30

## 2024-12-30 RX ORDER — DEXAMETHASONE SODIUM PHOSPHATE 4 MG/ML
INJECTION, SOLUTION INTRA-ARTICULAR; INTRALESIONAL; INTRAMUSCULAR; INTRAVENOUS; SOFT TISSUE
Status: DISCONTINUED | OUTPATIENT
Start: 2024-12-30 | End: 2024-12-30

## 2024-12-30 RX ORDER — OXYTOCIN-SODIUM CHLORIDE 0.9% IV SOLN 30 UNIT/500ML 30-0.9/5 UT/ML-%
95 SOLUTION INTRAVENOUS CONTINUOUS PRN
Status: DISCONTINUED | OUTPATIENT
Start: 2024-12-30 | End: 2025-01-03 | Stop reason: HOSPADM

## 2024-12-30 RX ORDER — CARBOPROST TROMETHAMINE 250 UG/ML
250 INJECTION, SOLUTION INTRAMUSCULAR
Status: DISCONTINUED | OUTPATIENT
Start: 2024-12-30 | End: 2025-01-03 | Stop reason: HOSPADM

## 2024-12-30 RX ORDER — ONDANSETRON HYDROCHLORIDE 2 MG/ML
INJECTION, SOLUTION INTRAVENOUS
Status: DISCONTINUED | OUTPATIENT
Start: 2024-12-30 | End: 2024-12-30

## 2024-12-30 RX ORDER — OXYTOCIN 10 [USP'U]/ML
INJECTION, SOLUTION INTRAMUSCULAR; INTRAVENOUS
Status: DISCONTINUED | OUTPATIENT
Start: 2024-12-30 | End: 2024-12-30

## 2024-12-30 RX ORDER — IBUPROFEN 600 MG/1
600 TABLET ORAL EVERY 6 HOURS
Status: DISCONTINUED | OUTPATIENT
Start: 2025-01-01 | End: 2024-12-31

## 2024-12-30 RX ORDER — METOCLOPRAMIDE HYDROCHLORIDE 5 MG/ML
INJECTION INTRAMUSCULAR; INTRAVENOUS
Status: DISCONTINUED | OUTPATIENT
Start: 2024-12-30 | End: 2024-12-30

## 2024-12-30 RX ORDER — METOCLOPRAMIDE HYDROCHLORIDE 5 MG/ML
10 INJECTION INTRAMUSCULAR; INTRAVENOUS ONCE
Status: DISCONTINUED | OUTPATIENT
Start: 2024-12-30 | End: 2024-12-30

## 2024-12-30 RX ORDER — OXYCODONE HYDROCHLORIDE 5 MG/1
5 TABLET ORAL EVERY 6 HOURS PRN
Status: DISCONTINUED | OUTPATIENT
Start: 2024-12-30 | End: 2025-01-03 | Stop reason: HOSPADM

## 2024-12-30 RX ORDER — MORPHINE SULFATE 0.5 MG/ML
INJECTION, SOLUTION EPIDURAL; INTRATHECAL; INTRAVENOUS
Status: DISCONTINUED | OUTPATIENT
Start: 2024-12-30 | End: 2024-12-30

## 2024-12-30 RX ADMIN — FAMOTIDINE 20 MG: 20 TABLET ORAL at 08:12

## 2024-12-30 RX ADMIN — ACETAMINOPHEN 1000 MG: 500 TABLET, FILM COATED ORAL at 12:12

## 2024-12-30 RX ADMIN — DEXTROSE MONOHYDRATE 3 MILLION UNITS: 50 INJECTION, SOLUTION INTRAVENOUS at 07:12

## 2024-12-30 RX ADMIN — PHENYLEPHRINE HYDROCHLORIDE 300 MCG: 10 INJECTION INTRAVENOUS at 12:12

## 2024-12-30 RX ADMIN — SODIUM CHLORIDE, POTASSIUM CHLORIDE, SODIUM LACTATE AND CALCIUM CHLORIDE 500 ML: 600; 310; 30; 20 INJECTION, SOLUTION INTRAVENOUS at 11:12

## 2024-12-30 RX ADMIN — DEXTROSE MONOHYDRATE 3 MILLION UNITS: 50 INJECTION, SOLUTION INTRAVENOUS at 02:12

## 2024-12-30 RX ADMIN — LIDOCAINE HYDROCHLORIDE,EPINEPHRINE BITARTRATE 5 ML: 20; .005 INJECTION, SOLUTION EPIDURAL; INFILTRATION; INTRACAUDAL; PERINEURAL at 08:12

## 2024-12-30 RX ADMIN — SODIUM CHLORIDE, POTASSIUM CHLORIDE, SODIUM LACTATE AND CALCIUM CHLORIDE: 600; 310; 30; 20 INJECTION, SOLUTION INTRAVENOUS at 07:12

## 2024-12-30 RX ADMIN — DEXTROSE MONOHYDRATE 3 MILLION UNITS: 50 INJECTION, SOLUTION INTRAVENOUS at 11:12

## 2024-12-30 RX ADMIN — FENTANYL CITRATE 100 MCG: 50 INJECTION, SOLUTION INTRAMUSCULAR; INTRAVENOUS at 08:12

## 2024-12-30 RX ADMIN — SODIUM CITRATE AND CITRIC ACID MONOHYDRATE 30 ML: 500; 334 SOLUTION ORAL at 08:12

## 2024-12-30 RX ADMIN — Medication 4 MILLI-UNITS/MIN: at 12:12

## 2024-12-30 RX ADMIN — FAMOTIDINE 20 MG: 10 INJECTION, SOLUTION INTRAVENOUS at 08:12

## 2024-12-30 RX ADMIN — FENTANYL CITRATE 3 ML: 50 INJECTION INTRAMUSCULAR; INTRAVENOUS at 11:12

## 2024-12-30 RX ADMIN — FENTANYL CITRATE 10 ML/HR: 50 INJECTION INTRAMUSCULAR; INTRAVENOUS at 11:12

## 2024-12-30 RX ADMIN — OXYTOCIN 5 UNITS: 10 INJECTION, SOLUTION INTRAMUSCULAR; INTRAVENOUS at 09:12

## 2024-12-30 RX ADMIN — DEXTROSE MONOHYDRATE 3 MILLION UNITS: 50 INJECTION, SOLUTION INTRAVENOUS at 06:12

## 2024-12-30 RX ADMIN — TRANEXAMIC ACID 1000 MG: 100 INJECTION, SOLUTION INTRAVENOUS at 09:12

## 2024-12-30 RX ADMIN — MORPHINE SULFATE 1 MG: 0.5 INJECTION, SOLUTION EPIDURAL; INTRATHECAL; INTRAVENOUS at 09:12

## 2024-12-30 RX ADMIN — ONDANSETRON HYDROCHLORIDE 4 MG: 2 INJECTION INTRAMUSCULAR; INTRAVENOUS at 09:12

## 2024-12-30 RX ADMIN — OXYTOCIN 10 UNITS: 10 INJECTION, SOLUTION INTRAMUSCULAR; INTRAVENOUS at 10:12

## 2024-12-30 RX ADMIN — PHENYLEPHRINE HYDROCHLORIDE 300 MCG: 10 INJECTION INTRAVENOUS at 01:12

## 2024-12-30 RX ADMIN — AZITHROMYCIN MONOHYDRATE 500 MG: 500 INJECTION, POWDER, LYOPHILIZED, FOR SOLUTION INTRAVENOUS at 08:12

## 2024-12-30 RX ADMIN — DIPHENOXYLATE HYDROCHLORIDE AND ATROPINE SULFATE 2 TABLET: 2.5; .025 TABLET ORAL at 10:12

## 2024-12-30 RX ADMIN — LIDOCAINE HYDROCHLORIDE,EPINEPHRINE BITARTRATE 2 ML: 20; .005 INJECTION, SOLUTION EPIDURAL; INFILTRATION; INTRACAUDAL; PERINEURAL at 09:12

## 2024-12-30 RX ADMIN — PHENYLEPHRINE HYDROCHLORIDE 150 MCG: 10 INJECTION INTRAVENOUS at 09:12

## 2024-12-30 RX ADMIN — DEXTROSE MONOHYDRATE 3 MILLION UNITS: 50 INJECTION, SOLUTION INTRAVENOUS at 03:12

## 2024-12-30 RX ADMIN — DEXAMETHASONE SODIUM PHOSPHATE 4 MG: 4 INJECTION, SOLUTION INTRAMUSCULAR; INTRAVENOUS at 09:12

## 2024-12-30 RX ADMIN — EPHEDRINE SULFATE 50 MG: 50 INJECTION INTRAVENOUS at 01:12

## 2024-12-30 RX ADMIN — KETOROLAC TROMETHAMINE 30 MG: 30 INJECTION, SOLUTION INTRAMUSCULAR; INTRAVENOUS at 09:12

## 2024-12-30 RX ADMIN — ACETAMINOPHEN 1000 MG: 10 INJECTION INTRAVENOUS at 09:12

## 2024-12-30 RX ADMIN — ONDANSETRON HYDROCHLORIDE 4 MG: 2 INJECTION INTRAMUSCULAR; INTRAVENOUS at 08:12

## 2024-12-30 RX ADMIN — LIDOCAINE HYDROCHLORIDE,EPINEPHRINE BITARTRATE 3 ML: 15; .005 INJECTION, SOLUTION EPIDURAL; INFILTRATION; INTRACAUDAL; PERINEURAL at 11:12

## 2024-12-30 RX ADMIN — SODIUM CITRATE AND CITRIC ACID MONOHYDRATE 30 ML: 334; 500 SOLUTION ORAL at 08:12

## 2024-12-30 RX ADMIN — DEXTROSE 2 G: 50 INJECTION, SOLUTION INTRAVENOUS at 09:12

## 2024-12-30 RX ADMIN — LIDOCAINE HYDROCHLORIDE,EPINEPHRINE BITARTRATE 4 ML: 20; .005 INJECTION, SOLUTION EPIDURAL; INFILTRATION; INTRACAUDAL; PERINEURAL at 09:12

## 2024-12-30 RX ADMIN — SODIUM CHLORIDE, SODIUM LACTATE, POTASSIUM CHLORIDE, AND CALCIUM CHLORIDE: 600; 310; 30; 20 INJECTION, SOLUTION INTRAVENOUS at 08:12

## 2024-12-30 RX ADMIN — ACETAMINOPHEN 1000 MG: 500 TABLET, FILM COATED ORAL at 08:12

## 2024-12-30 RX ADMIN — PHENYLEPHRINE HYDROCHLORIDE 200 MCG: 10 INJECTION INTRAVENOUS at 12:12

## 2024-12-30 RX ADMIN — PHENYLEPHRINE HYDROCHLORIDE 100 MCG: 10 INJECTION INTRAVENOUS at 09:12

## 2024-12-30 RX ADMIN — METOCLOPRAMIDE 5 MG: 5 INJECTION, SOLUTION INTRAMUSCULAR; INTRAVENOUS at 09:12

## 2024-12-30 RX ADMIN — Medication 40 MILLI-UNITS/MIN: at 11:12

## 2024-12-30 RX ADMIN — FAMOTIDINE 20 MG: 10 INJECTION INTRAVENOUS at 08:12

## 2024-12-30 NOTE — PROGRESS NOTES
"LABOR NOTE    S:  Complaints: No.  Epidural working:  not applicable  Resident to bedside for routine cervical exam    O: BP (!) 105/57   Pulse 82   Temp 98.3 °F (36.8 °C) (Oral)   Resp 18   Ht 5' 4" (1.626 m)   Wt 77.3 kg (170 lb 4.9 oz)   LMP 2024 (Approximate)   SpO2 98%   Breastfeeding No   BMI 29.23 kg/m²       FHT: 145 bpm, mod variability, + accels, - decels; Cat 1 (reassuring)  CTX: q 2 minutes, Pit @ 20  SVE: 50/-2, AROM clear       ASSESSMENT:   34 y.o.  at 36w2d, IOL 2/2 PreE w/ SF (LFTs)    FHT reassuring    Active Hospital Problems    Diagnosis  POA    *Transaminitis [R74.01]  Unknown    Multigravida of advanced maternal age in third trimester [O09.523]  Yes    Elevated blood pressure affecting pregnancy, antepartum [O16.9]  Yes      Resolved Hospital Problems   No resolved problems to display.     TIMELINE:   1215: Mccullough balloon replaced   1615: 50/-3; mccullough remains in place; pit @ 12  2030: 50/-2, mccullough deflated and removed, AROM clear       PLAN:    IOL:   Continue Close Maternal/Fetal Monitoring  Pitocin Augmentation per protocol  Recheck 2-4 hours or PRN    2. PreE w/ SF:   - BP: (100-155)/(57-92) 133/82   - Mag continued; No signs of toxicity   - Asymptomatic   - Cr 0.9   - AST/ALT 77/90   - Will continue to closely monitor     Trina Mcfadden MD (Mary)   Obstetrics and Gynecology, PGY1     "

## 2024-12-30 NOTE — CARE UPDATE
Resident to bedside 2/2 FHT deceleration to 90 and in the setting of maternal hypotension after epidural.  On arrival, pt actively being repositioned by RN staff. Pitocin paused, fluid bolus initiated.  SVE 3/80/-2, unchanged.  Anesthesia already in room and pressor bolus administered with good effect.  After maternal positioning to hands and knees, FHT returned to baseline.    On leaving room,    Temp:  [97.6 °F (36.4 °C)-98.5 °F (36.9 °C)] 98.1 °F (36.7 °C)  Pulse:  [] 71  Resp:  [18] 18  SpO2:  [93 %-100 %] 99 %  BP: ()/(37-93) 77/46 > increased to normotensive on bedside monitor     bpm, min aramis, -a, -d, Cat II (reassuring after repositioning, fluid/pressor bolus)  Ctxs q5 min    Plan to continue monitoring closely.  If FHT reassuring 30 min after pit stopped, can restart at 4 per protocol.      Justino Ruffin MD MS  OB/Gyn  PGY-2

## 2024-12-30 NOTE — PROGRESS NOTES
"LABOR NOTE    S:  Complaints: No.  Epidural working:  not applicable  Resident to bedside for routine cervical exam    O: /83   Pulse 81   Temp 98.3 °F (36.8 °C) (Oral)   Resp 18   Ht 5' 4" (1.626 m)   Wt 77.3 kg (170 lb 4.9 oz)   LMP 2024 (Approximate)   SpO2 99%   Breastfeeding No   BMI 29.23 kg/m²       FHT: 135 bpm, mod variability, + accels, - decels; Cat 1 (reassuring)  CTX: q 2-4 minutes, Pit @ 20  SVE: 2/50/-2, AROM clear       ASSESSMENT:   34 y.o.  at 36w2d, IOL 2/2 PreE w/ SF (LFTs)    FHT reassuring    Active Hospital Problems    Diagnosis  POA    *Transaminitis [R74.01]  Unknown    Multigravida of advanced maternal age in third trimester [O09.523]  Yes    Elevated blood pressure affecting pregnancy, antepartum [O16.9]  Yes      Resolved Hospital Problems   No resolved problems to display.     TIMELINE:   1215: Mccullough balloon replaced   1615: 2/50/-3; mccullough remains in place; pit @ 12  2030: 2/50/-2, mccullough deflated and removed, AROM clear   0025: 2/50/-2, pit @20       PLAN:    IOL:   Continue Close Maternal/Fetal Monitoring  Pitocin Augmentation per protocol  Recheck 2-4 hours or PRN    2. PreE w/ SF:   - BP: (100-155)/(57-92) 133/82   - Mag continued; No signs of toxicity. Reflexes 2+ bilaterally, LCTAB   - Asymptomatic   - Cr 0.9   - Urine Output: 150cc / hr   - AST/ALT 77/90 > 95/107   - Will continue to closely monitor     Trina Mcfadden MD (Mary)   Obstetrics and Gynecology, PGY1     "

## 2024-12-30 NOTE — PROGRESS NOTES
"LABOR NOTE    S:  MD to bedside for routine cervical exam. Epidural working:  not applicable  No pt concerns at this time    O: /77   Pulse 82   Temp 97.6 °F (36.4 °C) (Oral)   Resp 18   Ht 5' 4" (1.626 m)   Wt 77.3 kg (170 lb 4.9 oz)   LMP 03/19/2024 (Approximate)   SpO2 96%   Breastfeeding No   BMI 29.23 kg/m²     FHT: 140 bpm, moderate variability, +accels, -decels, Cat 1 (reassuring)  CTX: q 2-3 minutes, pit @ 32  SVE: 3/80/-2    TIMELINE:  1215: Mccullough balloon replaced   1615: 2/50/-3; mccullough remains in place; pit @ 12  2030: 2/50/-2, mccullough deflated and removed, AROM clear   0025: 2/50/-2, pit @20  0430: 3/60/-2; pit @ 32  0830: 3/80/-2, pit @ 32    ASSESSMENT:  Final Active Diagnoses:    Diagnosis Date Noted POA    PRINCIPAL PROBLEM:  Transaminitis [R74.01] 12/28/2024 Unknown    Multigravida of advanced maternal age in third trimester [O09.523] 12/28/2024 Yes    Elevated blood pressure affecting pregnancy, antepartum [O16.9] 12/28/2024 Yes      Problems Resolved During this Admission:     PLAN:    Continue Close Maternal/Fetal Monitoring  Pitocin Augmentation per protocol; will increase max dose to 40  Recheck 4 hours or PRN      Justino Ruffin MD MS  OB/Gyn  PGY-2      "

## 2024-12-30 NOTE — NURSING
Pt had episodes of low blood pressure and fetal heart rate deceleration post epidural insertion. Repositioning, bolus of LR  and pitocin stopped. PV checked unchanged at 3/80/-2. Pitocin augmentation restarted after a reassuring CTG. Magnesium sulfate infusion maintained at 1g/hr after labs were out as ordered.

## 2024-12-30 NOTE — PROGRESS NOTES
"LABOR NOTE    S:  Complaints: No.  Epidural working:  not applicable  Resident to bedside for routine cervical exam    O: /63   Pulse 75   Temp 98.2 °F (36.8 °C) (Oral)   Resp 18   Ht 5' 4" (1.626 m)   Wt 77.3 kg (170 lb 4.9 oz)   LMP 2024 (Approximate)   SpO2 96%   Breastfeeding No   BMI 29.23 kg/m²       FHT: 135 bpm, mod variability, + accels, - decels; Cat 1 (reassuring)  CTX: q 2-4 minutes, Pit @ 32  SVE: 360/-2      ASSESSMENT:   34 y.o.  at 36w2d, IOL 2/2 PreE w/ SF (LFTs)    FHT reassuring    Active Hospital Problems    Diagnosis  POA    *Transaminitis [R74.01]  Unknown    Multigravida of advanced maternal age in third trimester [O09.523]  Yes    Elevated blood pressure affecting pregnancy, antepartum [O16.9]  Yes      Resolved Hospital Problems   No resolved problems to display.     TIMELINE:   1215: Mccullough balloon replaced   1615: 2/50/-3; mccullough remains in place; pit @ 12  2030: 2/50/-2, mccullough deflated and removed, AROM clear   0025: 2/50/-2, pit @20  0430: 3/60/-2; pit @ 32      PLAN:    IOL:   Continue Close Maternal/Fetal Monitoring  Pitocin Augmentation per protocol  Recheck 2-4 hours or PRN    2. PreE w/ SF:   - BP: ()/(54-93) 117/63   - Mag continued; No signs of toxicity. Reflexes 2+ bilaterally, LCTAB   - Asymptomatic   - Cr 0.9   - Urine Output: 216 cc/hr   - AST/ALT 77/90 > 95/107   - Will continue to closely monitor     Gisell Maldonado MD  Obstetrics and Gynecology - PGY2      "

## 2024-12-30 NOTE — PLAN OF CARE
Problem: Adult Inpatient Plan of Care  Goal: Optimal Comfort and Wellbeing  Outcome: Progressing     Problem: Labor  Goal: Hemostasis  Outcome: Progressing  Goal: Stable Fetal Wellbeing  Outcome: Progressing  Goal: Absence of Infection Signs and Symptoms  Outcome: Progressing  Goal: Acceptable Pain Control  Outcome: Progressing  Goal: Normal Uterine Contraction Pattern  Outcome: Progressing     Problem: Hypertensive Disorders in Pregnancy  Goal: Patient-Fetal Stabilization  Outcome: Progressing     Stable. Vss. Afebrile. Cervical balloon d/c'd; Pitocin @ 32; cervix currently 3 cm. Mag infusion continued; good uop noted. Mild HA noted; resolved s/p tylenol. PCN continued for positive gbs status. Repositioned frequently; peanut ball utilized. Alysha. Spouse at bedside for support.

## 2024-12-30 NOTE — ANESTHESIA PROCEDURE NOTES
Epidural    Patient location during procedure: OB   Reason for block: primary anesthetic   Reason for block: labor analgesia requested by patient and obstetrician  Diagnosis: IUP   Start time: 12/30/2024 11:48 AM  Timeout: 12/30/2024 11:45 AM  End time: 12/30/2024 11:55 AM  Surgery related to: vaginal delivery    Staffing  Performing Provider: David Casper MD  Authorizing Provider: Eva Garnett MD    Staffing  Performed by: David Casper MD  Authorized by: Eva Garnett MD        Preanesthetic Checklist  Completed: patient identified, IV checked, site marked, risks and benefits discussed, surgical consent, monitors and equipment checked, pre-op evaluation, timeout performed, anesthesia consent given, hand hygiene performed and patient being monitored  Preparation  Patient position: sitting  Prep: ChloraPrep  Patient monitoring: ECG, Pulse Ox and Blood Pressure  Reason for block: primary anesthetic   Epidural  Skin Anesthetic: lidocaine 1%  Skin Wheal: 3 mL  Administration type: continuous  Approach: midline  Interspace: L3-4    Injection technique: SAMI air  Needle and Epidural Catheter  Needle type: Tuohy   Needle gauge: 17  Needle length: 3.5 inches  Needle insertion depth: 5 cm  Catheter type: [employer]  Catheter size: 19 G  Catheter at skin depth: 9 cm  Insertion Attempts: 1  Test dose: 3 mL of lidocaine 1.5% with Epi 1-to-200,000  Additional Documentation: no paresthesia on injection, incremental injection, no significant pain on injection, negative aspiration for heme and CSF, no signs/symptoms of IV or SA injection and no significant complaints from patient  Needle localization: anatomical landmarks  Medications:  Volume per aspiration: 5 mL  Time between aspirations: 5 minutes   Assessment  Ease of block: easy  Patient's tolerance of the procedure: comfortable throughout block and no complaints No inadvertent dural puncture with Tuohy.  Dural puncture performed with spinal  needle.

## 2024-12-31 PROBLEM — D62 ABLA (ACUTE BLOOD LOSS ANEMIA): Status: ACTIVE | Noted: 2024-12-31

## 2024-12-31 PROBLEM — O16.9 ELEVATED BLOOD PRESSURE AFFECTING PREGNANCY, ANTEPARTUM: Status: RESOLVED | Noted: 2024-12-28 | Resolved: 2024-12-31

## 2024-12-31 PROBLEM — R74.01 TRANSAMINITIS: Status: RESOLVED | Noted: 2024-12-28 | Resolved: 2024-12-31

## 2024-12-31 LAB
BACTERIA SPEC AEROBE CULT: NORMAL
BASOPHILS # BLD AUTO: 0.04 K/UL (ref 0–0.2)
BASOPHILS NFR BLD: 0.2 % (ref 0–1.9)
DIFFERENTIAL METHOD BLD: ABNORMAL
EOSINOPHIL # BLD AUTO: 0 K/UL (ref 0–0.5)
EOSINOPHIL NFR BLD: 0 % (ref 0–8)
ERYTHROCYTE [DISTWIDTH] IN BLOOD BY AUTOMATED COUNT: 13.2 % (ref 11.5–14.5)
HCT VFR BLD AUTO: 28.2 % (ref 37–48.5)
HGB BLD-MCNC: 9.8 G/DL (ref 12–16)
IMM GRANULOCYTES # BLD AUTO: 0.22 K/UL (ref 0–0.04)
IMM GRANULOCYTES NFR BLD AUTO: 1 % (ref 0–0.5)
LYMPHOCYTES # BLD AUTO: 1.3 K/UL (ref 1–4.8)
LYMPHOCYTES NFR BLD: 6 % (ref 18–48)
MCH RBC QN AUTO: 32.3 PG (ref 27–31)
MCHC RBC AUTO-ENTMCNC: 34.8 G/DL (ref 32–36)
MCV RBC AUTO: 93 FL (ref 82–98)
MONOCYTES # BLD AUTO: 1.1 K/UL (ref 0.3–1)
MONOCYTES NFR BLD: 5.1 % (ref 4–15)
NEUTROPHILS # BLD AUTO: 19.2 K/UL (ref 1.8–7.7)
NEUTROPHILS NFR BLD: 87.7 % (ref 38–73)
NRBC BLD-RTO: 0 /100 WBC
PLATELET # BLD AUTO: 165 K/UL (ref 150–450)
PMV BLD AUTO: 11.6 FL (ref 9.2–12.9)
RBC # BLD AUTO: 3.03 M/UL (ref 4–5.4)
WBC # BLD AUTO: 21.86 K/UL (ref 3.9–12.7)

## 2024-12-31 PROCEDURE — 85025 COMPLETE CBC W/AUTO DIFF WBC: CPT

## 2024-12-31 PROCEDURE — 63600175 PHARM REV CODE 636 W HCPCS

## 2024-12-31 PROCEDURE — 25000003 PHARM REV CODE 250

## 2024-12-31 PROCEDURE — 36415 COLL VENOUS BLD VENIPUNCTURE: CPT

## 2024-12-31 PROCEDURE — 11000001 HC ACUTE MED/SURG PRIVATE ROOM

## 2024-12-31 RX ORDER — NIFEDIPINE 30 MG/1
30 TABLET, EXTENDED RELEASE ORAL DAILY
Status: DISCONTINUED | OUTPATIENT
Start: 2024-12-31 | End: 2025-01-03 | Stop reason: HOSPADM

## 2024-12-31 RX ORDER — LANOLIN ALCOHOL/MO/W.PET/CERES
1 CREAM (GRAM) TOPICAL DAILY
Status: DISCONTINUED | OUTPATIENT
Start: 2025-01-01 | End: 2025-01-03 | Stop reason: HOSPADM

## 2024-12-31 RX ORDER — IBUPROFEN 600 MG/1
600 TABLET ORAL EVERY 6 HOURS
Status: DISCONTINUED | OUTPATIENT
Start: 2024-12-31 | End: 2025-01-03 | Stop reason: HOSPADM

## 2024-12-31 RX ADMIN — FAMOTIDINE 20 MG: 20 TABLET ORAL at 08:12

## 2024-12-31 RX ADMIN — DOCUSATE SODIUM 200 MG: 100 CAPSULE, LIQUID FILLED ORAL at 09:12

## 2024-12-31 RX ADMIN — PRENATAL VIT W/ FE FUMARATE-FA TAB 27-0.8 MG 1 TABLET: 27-0.8 TAB at 09:12

## 2024-12-31 RX ADMIN — KETOROLAC TROMETHAMINE 30 MG: 30 INJECTION, SOLUTION INTRAMUSCULAR; INTRAVENOUS at 03:12

## 2024-12-31 RX ADMIN — NIFEDIPINE 30 MG: 30 TABLET, FILM COATED, EXTENDED RELEASE ORAL at 05:12

## 2024-12-31 RX ADMIN — DOCUSATE SODIUM 200 MG: 100 CAPSULE, LIQUID FILLED ORAL at 08:12

## 2024-12-31 RX ADMIN — ACETAMINOPHEN 650 MG: 325 TABLET, FILM COATED ORAL at 05:12

## 2024-12-31 RX ADMIN — IBUPROFEN 600 MG: 600 TABLET, FILM COATED ORAL at 10:12

## 2024-12-31 RX ADMIN — KETOROLAC TROMETHAMINE 30 MG: 30 INJECTION, SOLUTION INTRAMUSCULAR; INTRAVENOUS at 09:12

## 2024-12-31 RX ADMIN — DIPHENHYDRAMINE HYDROCHLORIDE 25 MG: 25 CAPSULE ORAL at 04:12

## 2024-12-31 RX ADMIN — ACETAMINOPHEN 650 MG: 325 TABLET, FILM COATED ORAL at 10:12

## 2024-12-31 RX ADMIN — FAMOTIDINE 20 MG: 20 TABLET ORAL at 09:12

## 2024-12-31 RX ADMIN — ACETAMINOPHEN 650 MG: 325 TABLET, FILM COATED ORAL at 03:12

## 2024-12-31 RX ADMIN — ACETAMINOPHEN 650 MG: 325 TABLET, FILM COATED ORAL at 09:12

## 2024-12-31 RX ADMIN — SIMETHICONE 80 MG: 80 TABLET, CHEWABLE ORAL at 08:12

## 2024-12-31 RX ADMIN — IBUPROFEN 600 MG: 600 TABLET, FILM COATED ORAL at 05:12

## 2024-12-31 RX ADMIN — ENOXAPARIN SODIUM 40 MG: 40 INJECTION SUBCUTANEOUS at 05:12

## 2024-12-31 NOTE — L&D DELIVERY NOTE
[facility] - Labor & Delivery   Section   Operative Note    SUMMARY     Date of Procedure: 2024     Procedure: Procedure(s) (LRB):   SECTION (N/A)    Surgeons and Role:     * Chloe Moreno MD - Primary     * Justino Ruffin MD - Resident - Assisting     Section Procedure Note    Procedure:   1. Primary  Section via Pfannenstiel skin incision    Indications:   1. failed induction    Pre-operative Diagnosis:   1. IUP at 36 week 3 day pregnancy  2. Preeclampsia with severe features (transaminases)  3. AMA    Post-operative Diagnosis:   S/p plTCS  Post partum hemorrhage  2-3    Anesthesia: Epidural anesthesia    Findings:    1. Single viable  male infant, with APGARS 8/9, weight 2960 g  2. Normal appearing uterus, ovaries, and fallopian tubes  3. Normal placenta    Estimated Blood Loss:  1305 mL           Total IV Fluids: see anesthesia report     UOP: per anesthesia    Specimens: cord blood    PreOp CBC:   Lab Results   Component Value Date    WBC 14.69 (H) 2024    HGB 13.1 2024    HCT 37.0 2024    MCV 93 2024     2024                     Complications:  None; patient tolerated the procedure well.           Disposition: PACU - hemodynamically stable.           Condition: stable    Procedure Details   The patient was seen in her labor room. The risks, benefits, complications, treatment options, and expected outcomes were discussed with the patient.  The patient concurred with the proposed plan, giving informed consent.  The patient was taken to Operating Room, identified as Leanna Yee and the procedure verified as primary  Delivery. A Time Out was held and the above information confirmed.    After induction of anesthesia, the patient was prepped and draped in the usual sterile manner while placed in a dorsal supine position with a left lateral tilt.  A mccullough catheter was also placed per nursing. Preoperative  antibiotics Ancef 2 g and azithromycin 500 mg were administered. An allis test was performed confirming adequate anesthesia.  A Pfannenstiel incision was made and carried down through the subcutaneous tissue to the fascia. Fascial incision was made and extended transversely with curved saucedo scissors. The fascia was grasped with [facility] clamps and  from the underlying rectus tissue superiorly and inferiorly. The peritoneum was identified, found to be free of adherent bowel, and entered bluntly. Peritoneal incision was extended longitudinally. The vesico-uterine peritoneum was identified, and bladder blade was inserted to keep the bladder out of the operative field. A low transverse uterine incision was made with knife and extended with cephalo-caudad traction. The amniotic sac was ruptured with finger and the infant was noted to be in cephalic presentation. The fetal head was brought to the incision and elevated out of the pelvis. The patient delivered a single viable male infant without difficulty.  Infant weighed 2960 grams with Apgar scores of 8/9 at one and five minutes respectively. After the umbilical cord was clamped and cut, cord blood was obtained for evaluation. The placenta was removed intact, appeared normal, and was discarded. The uterus was exteriorized. The uterine incision was closed with running locked sutures of 1 chromic. The uterine outline, tubes and ovaries appeared normal. The uterus was noted to remain boggy after removal of placenta, so extra oxytocin was called for a administered by anesthesia.  Carboprost was called for and administered by RN; lomotil also given.  Improvement in uterine tone was then observed.  Several discreet areas of oozing were noted along the hysterotomy and treated with two figure of eight sutures and one horizontal mattress suture using 1 chromic.  Good hemostasis was then observed.    The uterus was returned to the abdominal cavity. Incision was reinspected  "and good hemostasis was noted. The abdominal cavity was irrigated to remove clots. The peritoneum was reapproximated with running 2-0 chromic. Rectus muscles were reapproximated with two vertical mattress sutures using 2-0 chromic.  The fascia was then reapproximated with running sutures of 0 vicryl. The subcutaneous fat was reapproximated with 2-0 chromic, and skin was reapproximated with 3-0 monocryl.    Instrument, sponge, and needle counts were correct prior the abdominal closure and at the conclusion of the case.     Pt tolerated procedure well and was in stable condition after the procedure.      **NOTE: This patient IS a candidate for trial of labor after  delivery**    Specimens:   Specimen (24h ago, onward)      None          VTE Risk Mitigation (From admission, onward)           Ordered     IP VTE HIGH RISK PATIENT  Once         24     Place sequential compression device  Until discontinued         24     Place sequential compression device  Until discontinued         24 0701                  Delivery Information for Jarek Yee    Birth information:  YOB: 2026   Time of birth: 9:14 PM   Sex: male   Head Delivery Date/Time: 2024  9:14 PM   Delivery type: , Low Transverse   Gestational Age: 36w3d       Delivery Providers    Delivering clinician: Chloe Moreno MD   Provider Role    Justino Ruffin MD Resident    Viky Irwin RN Circulator    Dagmar Kong RN Registered Nurse    Coral Smith RN Registered Nurse    Camille Maldonado RN Charge Nurse              Measurements    Weight: 2960 g  Weight (lbs): 6 lb 8.4 oz  Length: 52.1 cm  Length (in): 20.5"  Head circumference: 34.5 cm  Chest circumference: 31 cm         Apgars    Living status: Living  Apgar Component Scores:  1 min.:  5 min.:  10 min.:  15 min.:  20 min.:    Skin color:  0  1       Heart rate:  2  2       Reflex irritability:  2  2    "    Muscle tone:  2  2       Respiratory effort:  2  2       Total:  8  9       Apgars assigned by: JOSE E HAYNES RNC         Operative Delivery    Forceps attempted?: No  Vacuum extractor attempted?: No         Shoulder Dystocia    Shoulder dystocia present?: No           Presentation    Presentation: Vertex  Position: Right Occiput           Interventions/Resuscitation    Method: Bulb Suctioning, Blow By Oxygen, Tactile Stimulation, Deep Suctioning, CPAP       Cord    Vessels: 3 vessels  Complications: None  Delayed Cord Clamping?: Yes  Cord Clamped Date/Time: 2024  9:15 PM  Cord Blood Disposition: Sent with Baby  Gases Sent?: No  Stem Cell Collection (by MD): No       Placenta    Placenta delivery date/time: 2024  Placenta removal: Manual removal  Placenta appearance: Intact  Placenta disposition: Donation           Labor Events:       labor: No     Labor Onset Date/Time:         Dilation Complete Date/Time:         Start Pushing Date/Time:         Start Pushing Date/Time:       Rupture Date/Time: 24        Rupture type: ARM (Artificial Rupture)        Fluid Amount:       Fluid Color: Clear              steroids: None     Antibiotics given for GBS: Yes     Induction: balloon dilation (Lechuga);oxytocin     Indications for induction:  Severe Preeclampsia     Augmentation: amniotomy;oxytocin     Indications for augmentation: Ineffective Contraction Pattern;Hypertension     Labor complications: Failure to Progress in First Stage     Additional complications:          Cervical ripening:                     Delivery:      Episiotomy: None     Indication for Episiotomy:       Perineal Lacerations:   Repaired:      Periurethral Laceration:   Repaired:     Labial Laceration:   Repaired:     Sulcus Laceration:   Repaired:     Vaginal Laceration:   Repaired:     Cervical Laceration:   Repaired:     Repair suture:       Repair # of packets: 8     Last Value - EBL - Nursing (mL):       Sum  - EBL - Nursing (mL): 0     Last Value - EBL - Anesthesia (mL):      Calculated QBL (mL): 1305     Running total QBL (mL): 1305     Vaginal Sweep Performed: No     Surgicount Correct: Yes     Vaginal Packing: No Quantity:       Other providers:       Anesthesia    Method: Epidural          Details (if applicable):  Trial of Labor Yes    Categorization: Primary    Priority: Urgent   Indications for : Failed induction   Incision Type: low transverse     Additional  information:  Forceps:    Vacuum:    Breech:    Observed anomalies    Other (Comments):         Justino Ruffin MD MS  OB/Gyn  PGY-2

## 2024-12-31 NOTE — PROGRESS NOTES
POSTPARTUM PROGRESS NOTE    Subjective:     PPD/POD#: 1   Procedure: Primary LTCS   EGA: 36w3d   N/V: No   F/C: No   Abd Pain: Mild, well-controlled with oral pain medication   Lochia: Mild   Voiding: Lechuga remains in place draining clear yellow urine   Ambulating: No   Bowel fnc: No   Contraception: Per primary OB     Doing well overall.  Has had an apple and banana since delivery, but eager for more food.  No other concerns a this time.  Denies HA, visual disturbance nor SoB.    Objective:      Temp:  [97.4 °F (36.3 °C)-98.3 °F (36.8 °C)] 97.6 °F (36.4 °C)  Pulse:  [] 81  Resp:  [16-18] 17  SpO2:  [95 %-100 %] 100 %  BP: ()/(37-97) 131/97    Abdomen: Soft, appropriately tender   Uterus: Firm, no fundal tenderness   Incision: Bandage in place without shadowing     Lab Review    Recent Labs   Lab 12/29/24 0549 12/29/24 2030 12/30/24  1334   * 136 133*   K 4.0 4.7 4.0    109 107   CO2 15* 15* 17*   BUN 12 10 8   CREATININE 0.9 0.9 1.0   GLU 78 76 83   PROT 6.6 6.9 6.2   BILITOT 0.3 0.3 0.4   ALKPHOS 243* 252* 230*   ALT 90* 107* 96*   AST 77* 95* 80*   MG  --   --  4.9*     Recent Labs   Lab 12/28/24 2008 12/29/24  0549 12/29/24 2030   WBC 10.88 9.52 14.69*   HGB 11.9* 13.1 13.1   HCT 33.7* 36.5* 37.0   MCV 92 92 93    175 195     I/O    Intake/Output Summary (Last 24 hours) at 12/31/2024 0438  Last data filed at 12/31/2024 0215  Gross per 24 hour   Intake 3330.07 ml   Output 7705 ml   Net -4374.93 ml     UOP: 4.2 cc/kg/hr    Assessment and Plan:   Postpartum care:  - Patient doing well  - Continue routine management and advances  - s/p PPH; morning CBC pending.  Currently asymptomatic.    PreE w/SF  - BP as above  - asymptomatic  - preE labs as above  - UOP: 4.2 cc/kg/hr  - Mag: currently on and will remain until 2115 this evening (12/31); at rate of 1/gm/hr  - Hypertensive agent not indicated at this time        Justino Ruffin MD MS  OB/Gyn  PGY-2

## 2024-12-31 NOTE — ANESTHESIA POSTPROCEDURE EVALUATION
Anesthesia Post Evaluation    Patient: Leanna Yee    Procedure(s) Performed: C/S    Final Anesthesia Type: epidural      Patient location during evaluation: labor & delivery  Patient participation: Yes- Able to Participate  Level of consciousness: awake and alert  Post-procedure vital signs: reviewed and stable  Pain management: adequate  Airway patency: patent  JEAN mitigation strategies: Use of major conduction anesthesia (spinal/epidural) or peripheral nerve block and Multimodal analgesia  PONV status at discharge: No PONV  Anesthetic complications: no      Cardiovascular status: blood pressure returned to baseline, hemodynamically stable and stable  Respiratory status: unassisted, spontaneous ventilation and room air  Hydration status: euvolemic  Follow-up not needed.              Vitals Value Taken Time   BP 98/61 12/31/24 0703   Temp 36.4 °C (97.5 °F) 12/31/24 0545   Pulse 63 12/31/24 0753   Resp 17 12/31/24 0545   SpO2 96 % 12/31/24 0753   Vitals shown include unfiled device data.      No case tracking events are documented in the log.      Pain/Zakia Score: Pain Rating Prior to Med Admin: 2 (12/31/2024  3:29 AM)

## 2024-12-31 NOTE — TRANSFER OF CARE
"Anesthesia Transfer of Care Note    Patient: Leanna Yee    Procedure(s) Performed: * No procedures listed *    Patient location: Labor and Delivery    Anesthesia Type: epidural    Transport from OR: Transported from OR on room air with adequate spontaneous ventilation    Post pain: adequate analgesia    Post assessment: no apparent anesthetic complications and tolerated procedure well    Post vital signs: stable    Level of consciousness: awake, alert and oriented    Nausea/Vomiting: no nausea/vomiting    Complications: none    Transfer of care protocol was followed      Last vitals: Visit Vitals  BP 95/59   Pulse 101   Temp 36.5 °C (97.7 °F) (Oral)   Resp 16   Ht 5' 4" (1.626 m)   Wt 77.3 kg (170 lb 4.9 oz)   LMP 03/19/2024 (Approximate)   SpO2 100%   Breastfeeding No   BMI 29.23 kg/m²     "

## 2024-12-31 NOTE — CARE UPDATE
Resident to bedside with Dr. Pandya for SVE.  Unchanged from last check per Dr. Pandya.  Discussed with pt indications for failed induction of labor, now at 24 hour post AROM.  Recommendation for proceeding with pLTCS made and pt and partner both amenable.  R/B/A discussed and pt given opportunity to ask and answer questions.  Oxytocin turned off.    To OR for pLTCS.  Staff/Chief/Charge/Anesthesia aware.      Justino Ruffin MD MS  OB/Gyn  PGY-2

## 2025-01-01 PROCEDURE — 25000003 PHARM REV CODE 250

## 2025-01-01 PROCEDURE — 11000001 HC ACUTE MED/SURG PRIVATE ROOM

## 2025-01-01 PROCEDURE — 63600175 PHARM REV CODE 636 W HCPCS

## 2025-01-01 RX ORDER — BISACODYL 5 MG
5 TABLET, DELAYED RELEASE (ENTERIC COATED) ORAL ONCE
Status: COMPLETED | OUTPATIENT
Start: 2025-01-01 | End: 2025-01-01

## 2025-01-01 RX ORDER — POLYETHYLENE GLYCOL 3350 17 G/17G
17 POWDER, FOR SOLUTION ORAL DAILY
Status: DISCONTINUED | OUTPATIENT
Start: 2025-01-01 | End: 2025-01-03 | Stop reason: HOSPADM

## 2025-01-01 RX ORDER — CYCLOBENZAPRINE HCL 5 MG
5 TABLET ORAL ONCE
Status: COMPLETED | OUTPATIENT
Start: 2025-01-01 | End: 2025-01-01

## 2025-01-01 RX ADMIN — FAMOTIDINE 20 MG: 20 TABLET ORAL at 08:01

## 2025-01-01 RX ADMIN — IBUPROFEN 600 MG: 600 TABLET, FILM COATED ORAL at 04:01

## 2025-01-01 RX ADMIN — ACETAMINOPHEN 650 MG: 325 TABLET, FILM COATED ORAL at 04:01

## 2025-01-01 RX ADMIN — IBUPROFEN 600 MG: 600 TABLET, FILM COATED ORAL at 11:01

## 2025-01-01 RX ADMIN — PRENATAL VIT W/ FE FUMARATE-FA TAB 27-0.8 MG 1 TABLET: 27-0.8 TAB at 08:01

## 2025-01-01 RX ADMIN — DOCUSATE SODIUM 200 MG: 100 CAPSULE, LIQUID FILLED ORAL at 11:01

## 2025-01-01 RX ADMIN — NIFEDIPINE 30 MG: 30 TABLET, FILM COATED, EXTENDED RELEASE ORAL at 08:01

## 2025-01-01 RX ADMIN — CYCLOBENZAPRINE HYDROCHLORIDE 5 MG: 5 TABLET, FILM COATED ORAL at 07:01

## 2025-01-01 RX ADMIN — FERROUS SULFATE TAB 325 MG (65 MG ELEMENTAL FE) 1 EACH: 325 (65 FE) TAB at 08:01

## 2025-01-01 RX ADMIN — ENOXAPARIN SODIUM 40 MG: 40 INJECTION SUBCUTANEOUS at 05:01

## 2025-01-01 RX ADMIN — BISACODYL 5 MG: 5 TABLET, COATED ORAL at 07:01

## 2025-01-01 RX ADMIN — POLYETHYLENE GLYCOL 3350 17 G: 17 POWDER, FOR SOLUTION ORAL at 07:01

## 2025-01-01 RX ADMIN — DOCUSATE SODIUM 200 MG: 100 CAPSULE, LIQUID FILLED ORAL at 08:01

## 2025-01-01 RX ADMIN — ACETAMINOPHEN 650 MG: 325 TABLET, FILM COATED ORAL at 11:01

## 2025-01-01 RX ADMIN — IBUPROFEN 600 MG: 600 TABLET, FILM COATED ORAL at 05:01

## 2025-01-01 NOTE — LACTATION NOTE
12/31/24 1700   Maternal Assessment   Breast Shape Bilateral:;round   Breast Density Bilateral:;soft   Areola Bilateral:;elastic   Nipples Bilateral:;short   Maternal Infant Feeding   Maternal Emotional State assist needed   Infant Positioning clutch/football  (16mm nipple shield used)   Signs of Milk Transfer audible swallow;infant jaw motion present   Pain with Feeding no   Latch Assistance yes   Equipment Type   Breast Pump Type double electric, hospital grade   Breast Pump Flange Type hard   Breast Pump Flange Size 21 mm  (18 mm flange given to try using for next pumping sesion)   Breast Pumping   Breast Pumping Interventions post-feed pumping encouraged     Pt and fob attempting breastfeeding. Baby crying and having difficulty grasping breast. Pt has some ebm. Fob and pt syringe feeding baby 1.4 mls. Attempted latch again. Baby unable to grasp breast. 16 mm nipple shield applied to breast. Baby able to latch and suck.stimulation provided to keep baby actively breastfeeding. Colostrum in shield when baby came off breast. Educated pt on use of shield and cleaning. Fob to feed baby bottle of donor breastmilk. Pt to pump. Plan: breastfeed at least every 2-3 hours. Offer ebm/formula with each feeding, and pump for breastmilk production.

## 2025-01-01 NOTE — PLAN OF CARE
breastfeed at least every 2-3 hours. Offer ebm/formula with each feeding, and pump for breastmilk production  Pt will identify early hunger cues and respond.   Pt will breastfeed baby on cue or at least every 2-3 hours.  Pt will observe adequate milk transfer- audible/ visual swallows, enough wet and dirty diaper, satisfaction cues and hydration status.   Pt will give expressed breast milk if latch is not yet fully efficient.   Pt to pump after feedings. Pt to clean pumping equipment and nipple shield if needed.  Pt will call for further help as needed

## 2025-01-01 NOTE — NURSING
"Rounded on patient, she reported "feeling like she had the chills". Took temp: 97.7. Denies dizziness, nausea, head ache, vision problems. Notified MD. Will monitor closely. All safety measures in place.   "

## 2025-01-01 NOTE — NURSING
The following message was sent to dr wheeler's staff:  Hi, can you please call ms salazar to set up a 2 week post partum incision and mood check, pt was dx w/ preE w/SF and will check bp 3x/day w/connect mom. Thank you

## 2025-01-01 NOTE — PLAN OF CARE
Mother to breastfeed infant 8 or more times in 24hrs on infant's cue until content, frequent skin to skin, and will avoid pacifiers.  Mother is to keep infant actively feeding by keeping infant stimulated and using breast compression. Mother ensure effective nursing by hearing infant swallows and feeling nice tugs and pulls. Latch should not be painful while nursing.  Mother to record all breastfeedings, voids and stools in breastfeeding guide. Mother to call  for breastfeeding assistance or questions .  Refer breastfeeding guide for lactation education.     Nurse, pump, supplement with EBM/ DBM.

## 2025-01-01 NOTE — LACTATION NOTE
LC to room at 1205- pt reports baby last fed at 1045, visitors in room holding baby; LC encouraged parents to call LC when baby showing cues or at next feeding at 1345.      LC called to room at 1240, baby showing hunger cues. LC educated on diaper change, STS, position and latch. 16mm nipple shield utilized on right breast, baby sleepy, requiring breast compression and stimulation; nipple shield not needed on left breast, baby nursed well on both breasts with assistance. Discussed late   feeding behaviors. Feeding plan to nurse both breasts, then pump to protect milk supply while FOB pace bottle feeds donor milk.     LC washed pump kit and sterilize parts, nipple shield and purple nipple. LC assisted with education of paced bottle feeding with FOB and assisted mom with pumping with Symphony. Reviewed her personal pump- Baby Buddha, parts and pump use; but pt will need to read the owner manual for further information regarding settings. Pt to order smaller inserts for her own pump.     Feeding Consult time 1240   25 1447   Maternal Assessment   Breast Shape Bilateral:;round   Breast Density Bilateral:;soft;filling   Areola Bilateral:;elastic   Nipples Bilateral:;short   Maternal Infant Feeding   Maternal Emotional State assist needed   Infant Positioning clutch/football   Signs of Milk Transfer audible swallow;infant jaw motion present   Pain with Feeding no   Comfort Measures Before/During Feeding infant position adjusted;latch adjusted   Nipple Shape After Feeding, Left round   Nipple Shape After Feeding, Right round  (in nipple shield)   Latch Assistance yes   Equipment Type   Breast Pump Type double electric, hospital grade   Breast Pump Flange Type hard   Breast Pump Flange Size   (18mm flange used)   Breast Pumping   Breast Pumping Interventions post-feed pumping encouraged;frequent pumping encouraged;early pumping promoted   Breast Pumping bilateral breasts pumped until soft;double  electric breast pump utilized   Community Referrals   Community Referrals support group;pediatric care provider;outpatient lactation program

## 2025-01-01 NOTE — DISCHARGE INSTRUCTIONS
Community Resources for Breastfeeding Mothers:   Hospital Breastfeeding Centers/ Lactation Consultants:   [facility]........................................................................................[phone]  Outpatient Lactation Consults               [phone]   [facility]....................................................................................[phone]   [facility]..........................................................................................[phone]   [facility].................................................[phone]   [facility].......................................................................................[phone]   [facility].................................[phone]   [facility].......................................[phone]   [facility]..................................................[phone]   [facility].....................................................................[phone]      [facility] (Poison Control)...........[phone]  PoisonHelp.org   Free medical advice 24/7 through the Poison Help Line and the online tool      Online Resources:   International Breastfeeding [place] ...............................................................................ibconline.ca   Dr Ephraim Sheridan online resource provides videos, articles, and information sheets.     Coeffective...............................................................................................................coeffective.com   Download the free mobile luis to help get off to a great start with breastfeeding.   Droplet.....................................................................................................................Si[employer].com   Global  Health Media...........................................................................................AVTherapeutics.org   Videos that teach and empower mothers and caregivers   Infant [facility].............................................................................[phone]      Snapdeal   Provides up to date information for medication use by moms during pregnancy and while breastfeeding.   Barb Schultz....................................................................................................................kellymom.com   Provides online information on breastfeeding and parenting      La Leche League........................................................................................ lllalmsla.org   /   llli.org   Mother-to-mother support groups with education, information support, and encouragement    Work and Pump........................................................................................... Mainstream Data.com   Information about breastfeeding for working moms     [place] Resources:   [place] Breastfeeding Coalition............................... [phone]    [facility]   Find local breastfeeding support   [place] Breastfeeding Support............................................................ LaBreastfeedingSport.org   Zip code search of breastfeeding resources in your area   Partners for Healthy Babies............................................................[phone]   9451346ypcd.org   Connects [place] moms and their families to health and pregnancy resources.  24/7   WIC (Women, Infant, Children)......................................................... [phone]   ldh.la.gov/WIC   Download the Vaimicom luis, get code from WIC office    [facility] Resources:     Baby Cafe............................................................................................................. babycafeusa.org   Free, drop in, informal  breastfeeding support groups offering professional lactation care and intervention.    [facility]/ [place] [facility]....................................... birthmarkDynamo Plastics.com   Infant feeding drop in clinics, Lactation services, support groups, education programs   [facility]afe [facility]...............................................[phone]   Kudan.com/groups/[facility]   Free breastfeeding support group for families of color   Mothers Milk Bank [facility]....................................................[phone]                                                             JoinUp TaxisSmartSignal.[employer]/services/mothers-milk-bank-[facility]   [facility]..................................................................................[phone] nolanesting.com   In person and virtual support for families through pregnancy, birth, and early parenthood.       [facility] [place]- Dr. Grace Macias.......................[phone]   [address] [place] [address]                                  [email or web address]   [email or web address]   [facility], [facility] (Sophia Blake RN, IBCLC) ............................[phone] [email or web address]    Healthy Start [place].....................................[phone] ([place])  [phone] ([place])   [facility].gov/health-department/healthy-start   Serves women of childbearing age and addresses issues for pregnant women and their children from birth  to age two.          La Leche League- [place]............................. NetConstat.NetConstat/ [place].NetConstat/ Intellijoulesteve   In person and virtual mother to mother support groups with education, information support and   encouragement to women who want to breastfeed      [place] Resources:   Breastfeeding Resources- [facility] of  Health.....msdh.ms.gov (under womens services)   Find resources and info about planning for breastfeeding, its benefits, and help with breastfeeding  s uccessfully.    [facility] Cho[facility]- [place]....................................... [employer]IN [employer]itness   [phone]   [address] [place], [place]. Call or text 24/7   [facility].......................................................Impres Medicalastbreastfeedingcenter.GeekChicDaily   [facility] Lactation Consultants sere [facility], including [facility],   [facility], and surrounding areas.    [place] Breastfeeding Coalition...............................................................................msbfc.org   Promotes and supports breastfeeding with families, health providers, and communities.   [facility] breastfeeding Coalition.....................................................................smbfc.org   Find breastfeeding resources and support groups in your area.    WIC Nutrition Program- [facility] of Health.................................... [place].gov

## 2025-01-01 NOTE — PROGRESS NOTES
POSTPARTUM PROGRESS NOTE    Subjective:     PPD/POD#: 2   Procedure: Primary LTCS   EGA: 36w3d   N/V: No   F/C: No   Abd Pain: Mild, well-controlled with oral pain medication   Lochia: Mild   Voiding: Yes   Ambulating: Yes   Bowel fnc: Passing gas, feels very constipated, last BM Friday    Contraception: Per primary OB     Reports neck pain from poor posture with breastfeeding.     Objective:      Temp:  [97.5 °F (36.4 °C)-98.1 °F (36.7 °C)] 97.6 °F (36.4 °C)  Pulse:  [68-87] 87  Resp:  [16-18] 18  SpO2:  [96 %-100 %] 100 %  BP: ()/(61-91) 142/78    Abdomen: Soft, appropriately tender   Uterus: Firm, no fundal tenderness   Incision: Bandage in place without shadowing     Lab Review    Recent Labs   Lab 12/29/24  0549 12/29/24  2030 12/30/24  1334   * 136 133*   K 4.0 4.7 4.0    109 107   CO2 15* 15* 17*   BUN 12 10 8   CREATININE 0.9 0.9 1.0   GLU 78 76 83   PROT 6.6 6.9 6.2   BILITOT 0.3 0.3 0.4   ALKPHOS 243* 252* 230*   ALT 90* 107* 96*   AST 77* 95* 80*   MG  --   --  4.9*     Recent Labs   Lab 12/29/24  0549 12/29/24  2030 12/31/24  0450   WBC 9.52 14.69* 21.86*   HGB 13.1 13.1 9.8*   HCT 36.5* 37.0 28.2*   MCV 92 93 93    195 165     I/O    Intake/Output Summary (Last 24 hours) at 1/1/2025 0548  Last data filed at 12/31/2024 2200  Gross per 24 hour   Intake 1237.43 ml   Output 4050 ml   Net -2812.57 ml     Assessment and Plan:   Postpartum care:  - Patient doing well  - Continue routine management and advances    PPH/Acute blood loss anemia  - H/H as above  - QBL: 1300cc  - asymptomatic  - iron/colace    PreE w/SF  - BP as above  - asymptomatic  - preE labs as above  - Mag: s/p 24h postpartum  - Hypertensive agent: procardia XL 30mg

## 2025-01-02 ENCOUNTER — ANESTHESIA EVENT (OUTPATIENT)
Dept: OBSTETRICS AND GYNECOLOGY | Facility: OTHER | Age: 35
End: 2025-01-02

## 2025-01-02 ENCOUNTER — ANESTHESIA (OUTPATIENT)
Dept: OBSTETRICS AND GYNECOLOGY | Facility: OTHER | Age: 35
End: 2025-01-02

## 2025-01-02 LAB
OHS QRS DURATION: 82 MS
OHS QTC CALCULATION: 438 MS

## 2025-01-02 PROCEDURE — 25000003 PHARM REV CODE 250

## 2025-01-02 PROCEDURE — 99024 POSTOP FOLLOW-UP VISIT: CPT | Mod: ,,, | Performed by: OBSTETRICS & GYNECOLOGY

## 2025-01-02 PROCEDURE — 25000003 PHARM REV CODE 250: Performed by: OBSTETRICS & GYNECOLOGY

## 2025-01-02 PROCEDURE — 11000001 HC ACUTE MED/SURG PRIVATE ROOM

## 2025-01-02 PROCEDURE — 63600175 PHARM REV CODE 636 W HCPCS

## 2025-01-02 RX ORDER — AMOXICILLIN 250 MG
1 CAPSULE ORAL DAILY PRN
Qty: 30 TABLET | Refills: 0 | Status: SHIPPED | OUTPATIENT
Start: 2025-01-02

## 2025-01-02 RX ORDER — IBUPROFEN 600 MG/1
600 TABLET ORAL EVERY 6 HOURS PRN
Qty: 60 TABLET | Refills: 0 | Status: SHIPPED | OUTPATIENT
Start: 2025-01-02

## 2025-01-02 RX ORDER — SIMETHICONE 80 MG
2 TABLET,CHEWABLE ORAL
Status: DISCONTINUED | OUTPATIENT
Start: 2025-01-02 | End: 2025-01-03 | Stop reason: HOSPADM

## 2025-01-02 RX ORDER — OXYCODONE HYDROCHLORIDE 5 MG/1
5 TABLET ORAL EVERY 4 HOURS PRN
Qty: 20 TABLET | Refills: 0 | Status: SHIPPED | OUTPATIENT
Start: 2025-01-02

## 2025-01-02 RX ORDER — DEXTROMETHORPHAN HYDROBROMIDE, GUAIFENESIN 5; 100 MG/5ML; MG/5ML
650 LIQUID ORAL EVERY 6 HOURS PRN
Qty: 60 TABLET | Refills: 0 | Status: SHIPPED | OUTPATIENT
Start: 2025-01-02

## 2025-01-02 RX ORDER — NIFEDIPINE 30 MG/1
30 TABLET, EXTENDED RELEASE ORAL DAILY
Qty: 30 TABLET | Refills: 3 | Status: SHIPPED | OUTPATIENT
Start: 2025-01-02 | End: 2026-01-02

## 2025-01-02 RX ADMIN — PRENATAL VIT W/ FE FUMARATE-FA TAB 27-0.8 MG 1 TABLET: 27-0.8 TAB at 09:01

## 2025-01-02 RX ADMIN — FAMOTIDINE 20 MG: 20 TABLET ORAL at 09:01

## 2025-01-02 RX ADMIN — DOCUSATE SODIUM 200 MG: 100 CAPSULE, LIQUID FILLED ORAL at 09:01

## 2025-01-02 RX ADMIN — SIMETHICONE 160 MG: 80 TABLET, CHEWABLE ORAL at 03:01

## 2025-01-02 RX ADMIN — IBUPROFEN 600 MG: 600 TABLET, FILM COATED ORAL at 05:01

## 2025-01-02 RX ADMIN — IBUPROFEN 600 MG: 600 TABLET, FILM COATED ORAL at 04:01

## 2025-01-02 RX ADMIN — NIFEDIPINE 30 MG: 30 TABLET, FILM COATED, EXTENDED RELEASE ORAL at 09:01

## 2025-01-02 RX ADMIN — SIMETHICONE 160 MG: 80 TABLET, CHEWABLE ORAL at 09:01

## 2025-01-02 RX ADMIN — SIMETHICONE 160 MG: 80 TABLET, CHEWABLE ORAL at 08:01

## 2025-01-02 RX ADMIN — POLYETHYLENE GLYCOL 3350 17 G: 17 POWDER, FOR SOLUTION ORAL at 09:01

## 2025-01-02 RX ADMIN — ENOXAPARIN SODIUM 40 MG: 40 INJECTION SUBCUTANEOUS at 05:01

## 2025-01-02 RX ADMIN — FAMOTIDINE 20 MG: 20 TABLET ORAL at 08:01

## 2025-01-02 RX ADMIN — FERROUS SULFATE TAB 325 MG (65 MG ELEMENTAL FE) 1 EACH: 325 (65 FE) TAB at 09:01

## 2025-01-02 RX ADMIN — DOCUSATE SODIUM 200 MG: 100 CAPSULE, LIQUID FILLED ORAL at 08:01

## 2025-01-02 RX ADMIN — IBUPROFEN 600 MG: 600 TABLET, FILM COATED ORAL at 11:01

## 2025-01-02 RX ADMIN — IBUPROFEN 600 MG: 600 TABLET, FILM COATED ORAL at 10:01

## 2025-01-02 NOTE — ADDENDUM NOTE
Addendum  created 01/02/25 0925 by Eva Garnett MD    Attestation recorded in Intraprocedure, Intraprocedure Attestations filed

## 2025-01-02 NOTE — DISCHARGE SUMMARY
Delivery Discharge Summary  Obstetrics      Primary OB Clinician: Anastasia Garnett MD      Admission date: 2024  Discharge date: 2025    Disposition: To home, self care    Discharge Diagnosis List:      Patient Active Problem List   Diagnosis    Hip weakness    Pregnancy related back pain, antepartum    Multigravida of advanced maternal age in third trimester    Pre-eclampsia in third trimester    Status post  delivery    PPH (postpartum hemorrhage)    ABLA (acute blood loss anemia)       Procedure: , due to failed IOL    Hospital Course:  Leanna Yee is a 34 y.o. now , POD #3 who was admitted on 2024 to obs at 36w1d for transaminitis and HA. Patient was subsequently admitted to labor and delivery unit with signed consents. Repeat labs demonstrated LFTs 2x upper limit of normal and patient was diagnosed with preE w/ SF. IOL was initiated.     Labor course was complicated by failed IOL, and decision was made to proceed with delivery via  which was performed without complications. Please see delivery note for further details.     Her postpartum course was uncomplicated. She received 24 hours of MgSO4 postpartum. BP remained elevated and patient was started on Procardia 30 with good control. On discharge day, patient's pain is controlled with oral pain medications. Pt is tolerating ambulation without SOB or CP, and regular diet without N/V. Reports lochia is mild. Denies any HA, vision changes, F/C, LE swelling. Denies any breast pain/soreness.    Pt in stable condition and ready for discharge. She has been instructed to start and/or continue medications and follow up with her obstetrics provider as listed below.    Pertinent studies:  CBC  Recent Labs   Lab 24  0549 24  2030 24  0450   WBC 9.52 14.69* 21.86*   HGB 13.1 13.1 9.8*   HCT 36.5* 37.0 28.2*   MCV 92 93 93    195 165          Immunization History   Administered Date(s)  "Administered    COVID-19, mRNA, LNP-S, bivalent booster, PF (PFIZER OMICRON) 10/17/2022    Influenza - Quadrivalent - MDCK - PF 10/17/2022    Influenza - Trivalent - Fluarix, Flulaval, Fluzone, Afluria - PF 2024    Rsv, Bivalent, Rsvpref (Abrysvo) 2024    Tdap 2024        Delivery:    Episiotomy: None   Lacerations:     Repair suture:     Repair # of packets: 8   Blood loss (ml):       Birth information:  YOB: 2026   Time of birth: 9:14 PM   Sex: male   Delivery type: , Low Transverse   Gestational Age: 36w3d     Measurements    Weight: 2960 g  Weight (lbs): 6 lb 8.4 oz  Length: 52.1 cm  Length (in): 20.5"  Head circumference: 34.5 cm  Chest circumference: 31 cm         Delivery Clinician: Delivery Providers    Delivering clinician: Chloe Moreno MD   Provider Role    Justino Ruffin MD Resident    Viky Irwin RN Circulator    Dagmar Haynes RN Registered Nurse    Coral Smith RN Registered Nurse    Camille Maldonado RN Charge Nurse             Additional  information:  Forceps:    Vacuum:    Breech:    Observed anomalies      Living?:     Apgars    Living status: Living  Apgar Component Scores:  1 min.:  5 min.:  10 min.:  15 min.:  20 min.:    Skin color:  0  1       Heart rate:  2  2       Reflex irritability:  2  2       Muscle tone:  2  2       Respiratory effort:  2  2       Total:  8  9       Apgars assigned by: JOSE E HAYNES RNC         Placenta: Delivered:       appearance    Patient Instructions:   Current Discharge Medication List        START taking these medications    Details   acetaminophen (TYLENOL) 650 MG TbSR Take 1 tablet (650 mg total) by mouth every 6 (six) hours as needed.  Qty: 60 tablet, Refills: 0      ibuprofen (ADVIL,MOTRIN) 600 MG tablet Take 1 tablet (600 mg total) by mouth every 6 (six) hours as needed.  Qty: 60 tablet, Refills: 0      NIFEdipine (PROCARDIA-XL) 30 MG (OSM) 24 hr tablet Take 1 tablet (30 mg total) by " mouth once daily.  Qty: 30 tablet, Refills: 3    Comments: .      oxyCODONE (ROXICODONE) 5 MG immediate release tablet Take 1 tablet (5 mg total) by mouth every 4 (four) hours as needed.  Qty: 20 tablet, Refills: 0    Comments: Quantity prescribed more than 7 day supply? No  Associated Diagnoses: Status post  delivery      senna-docusate 8.6-50 mg (PERICOLACE) 8.6-50 mg per tablet Take 1 tablet by mouth daily as needed.  Qty: 30 tablet, Refills: 0           CONTINUE these medications which have NOT CHANGED    Details   famotidine (PEPCID) 10 MG tablet Take 10 mg by mouth 2 (two) times daily.      folic acid (FOLVITE) 800 MCG Tab Take 800 mcg by mouth once daily.      loratadine (CLARITIN) 5 mg chewable tablet Take 5 mg by mouth as needed for Allergies. Taking Q 6 to 8 hours PRN for headaches.      prenatal 25/iron/folate 6/dha (PRENA1 ORAL) Take by mouth.           STOP taking these medications       aspirin (ECOTRIN) 81 MG EC tablet Comments:   Reason for Stopping:         clindamycin-benzoyl peroxide gel Comments:   Reason for Stopping:               Discharge Procedure Orders   Diet Adult Regular     Pelvic Rest   Order Comments: Pelvic rest until follow up visit. Nothing in vagina -no sex, tampons, douching, etc.     Notify your health care provider if you experience any of the following:   Order Comments: - Heavy vaginal bleeding soaking through more than 2 pads/hour for > 2 hours  - Severe abdominal pain  - Headache not relieved with Tylenol  - Vision changes  - Chest pain or shortness or breath     Notify your health care provider if you experience any of the following:  increased confusion or weakness     Notify your health care provider if you experience any of the following:  persistent dizziness, light-headedness, or visual disturbances     Notify your health care provider if you experience any of the following:  severe persistent headache     Notify your health care provider if you experience any  of the following:  difficulty breathing or increased cough     Notify your health care provider if you experience any of the following:  redness, tenderness, or signs of infection (pain, swelling, redness, odor or green/yellow discharge around incision site)     Notify your health care provider if you experience any of the following:  severe uncontrolled pain     Notify your health care provider if you experience any of the following:  persistent nausea and vomiting or diarrhea     Notify your health care provider if you experience any of the following:  temperature >100.4        Follow-up Information       Anastasia Garnett MD Follow up in 3 day(s).    Specialty: Obstetrics and Gynecology  Why: BP check  Contact information:  [address]  [address]  [facility] [id number] [phone]               Anastasia Garnett MD Follow up in 6 week(s).    Specialty: Obstetrics and Gynecology  Why: Postpartum Visit  Contact information:  [address]  [address]  [facility] [id number] [phone]                              Penny Ramírez MD  OBGYN, PGY-4

## 2025-01-02 NOTE — PROGRESS NOTES
POSTPARTUM PROGRESS NOTE    Subjective:     PPD/POD#: 3   Procedure: Primary LTCS   EGA: 36w3d   N/V: No   F/C: No   Abd Pain: Mild, well-controlled with oral pain medication   Lochia: Mild   Voiding: Yes   Ambulating: Yes   Bowel fnc: Passing gas, + BM    Contraception: Per primary OB     No complaints this am     Objective:      Temp:  [97.6 °F (36.4 °C)-98.6 °F (37 °C)] 97.7 °F (36.5 °C)  Pulse:  [78-94] 82  Resp:  [17-18] 17  SpO2:  [97 %-100 %] 99 %  BP: (113-147)/(69-86) 136/80    Abdomen: Soft, appropriately tender   Uterus: Firm, no fundal tenderness   Incision: Bandage in place without shadowing     Lab Review    Recent Labs   Lab 12/29/24  0549 12/29/24  2030 12/30/24  1334   * 136 133*   K 4.0 4.7 4.0    109 107   CO2 15* 15* 17*   BUN 12 10 8   CREATININE 0.9 0.9 1.0   GLU 78 76 83   PROT 6.6 6.9 6.2   BILITOT 0.3 0.3 0.4   ALKPHOS 243* 252* 230*   ALT 90* 107* 96*   AST 77* 95* 80*   MG  --   --  4.9*     Recent Labs   Lab 12/29/24  0549 12/29/24  2030 12/31/24  0450   WBC 9.52 14.69* 21.86*   HGB 13.1 13.1 9.8*   HCT 36.5* 37.0 28.2*   MCV 92 93 93    195 165     I/O  No intake or output data in the 24 hours ending 01/02/25 0651    Assessment and Plan:   Postpartum care:  - Patient doing well  - Continue routine management and advances  -holding hepatotoxic medications ISO elevated LFTs     PPH/Acute blood loss anemia  - H/H as above  - QBL: 1300cc  - asymptomatic  - iron/colace    PreE w/SF  - BP as above  - asymptomatic  - preE labs as above  - Mag: s/p 24h postpartum  - Hypertensive agent: procardia XL 30mg    Trina Mcfadden MD (Mary)   Obstetrics and Gynecology, PGY1

## 2025-01-02 NOTE — PLAN OF CARE
VSS. Patient ambulating, voiding, and passing flatus. Fundus firm, midline, with light lochia. Pain controlled with oral pain medications. Bonding appropriately with infant at bedside. Attentive to infant cues. No additional information at this time.

## 2025-01-03 VITALS
HEIGHT: 64 IN | TEMPERATURE: 98 F | WEIGHT: 170.31 LBS | DIASTOLIC BLOOD PRESSURE: 80 MMHG | BODY MASS INDEX: 29.08 KG/M2 | SYSTOLIC BLOOD PRESSURE: 120 MMHG | OXYGEN SATURATION: 98 % | HEART RATE: 101 BPM | RESPIRATION RATE: 18 BRPM

## 2025-01-03 PROCEDURE — 25000003 PHARM REV CODE 250

## 2025-01-03 PROCEDURE — 25000003 PHARM REV CODE 250: Performed by: OBSTETRICS & GYNECOLOGY

## 2025-01-03 PROCEDURE — 0503F POSTPARTUM CARE VISIT: CPT | Mod: ,,, | Performed by: STUDENT IN AN ORGANIZED HEALTH CARE EDUCATION/TRAINING PROGRAM

## 2025-01-03 RX ADMIN — FERROUS SULFATE TAB 325 MG (65 MG ELEMENTAL FE) 1 EACH: 325 (65 FE) TAB at 08:01

## 2025-01-03 RX ADMIN — SIMETHICONE 160 MG: 80 TABLET, CHEWABLE ORAL at 08:01

## 2025-01-03 RX ADMIN — POLYETHYLENE GLYCOL 3350 17 G: 17 POWDER, FOR SOLUTION ORAL at 08:01

## 2025-01-03 RX ADMIN — FAMOTIDINE 20 MG: 20 TABLET ORAL at 08:01

## 2025-01-03 RX ADMIN — PRENATAL VIT W/ FE FUMARATE-FA TAB 27-0.8 MG 1 TABLET: 27-0.8 TAB at 08:01

## 2025-01-03 RX ADMIN — IBUPROFEN 600 MG: 600 TABLET, FILM COATED ORAL at 06:01

## 2025-01-03 RX ADMIN — NIFEDIPINE 30 MG: 30 TABLET, FILM COATED, EXTENDED RELEASE ORAL at 08:01

## 2025-01-03 RX ADMIN — DOCUSATE SODIUM 200 MG: 100 CAPSULE, LIQUID FILLED ORAL at 08:01

## 2025-01-03 NOTE — PLAN OF CARE
Mother to breastfeed infant 8 or more times in 24hrs on infant's cue until content, frequent skin to skin, and will avoid pacifiers.  Mother is to keep infant actively feeding by keeping infant stimulated and using breast compression. Mother ensure effective nursing by hearing infant swallows and feeling nice tugs and pulls. Latch should not be painful while nursing.  Mother to record all breastfeedings, voids and stools in breastfeeding guide. Mother to call LC for breastfeeding assistance or questions .  Refer breastfeeding guide for lactation education.       Nurse, pump, supplement.

## 2025-01-03 NOTE — DISCHARGE SUMMARY
Delivery Discharge Summary  Obstetrics      Primary OB Clinician: Anastasia Garnett MD      Admission date: 2024  Discharge date: 2025    Disposition: To home, self care    Discharge Diagnosis List:      Patient Active Problem List   Diagnosis    Hip weakness    Pregnancy related back pain, antepartum    Multigravida of advanced maternal age in third trimester    Pre-eclampsia in third trimester    Status post  delivery    PPH (postpartum hemorrhage)    ABLA (acute blood loss anemia)       Procedure: , due to failed IOL    Hospital Course:  Leanna Yee is a 34 y.o. now , POD #4 who was admitted on 2024 to obs at 36w1d for transaminitis and HA. Patient was subsequently admitted to labor and delivery unit with signed consents. Repeat labs demonstrated LFTs 2x upper limit of normal and patient was diagnosed with preE w/ SF. IOL was initiated.     Labor course was complicated by failed IOL, and decision was made to proceed with delivery via  which was performed without complications. Please see delivery note for further details.     Her postpartum course was uncomplicated. She received 24 hours of MgSO4 postpartum. BP remained elevated and patient was started on Procardia 30 with good control. On discharge day, patient's pain is controlled with oral pain medications. Pt is tolerating ambulation without SOB or CP, and regular diet without N/V. Reports lochia is mild. Denies any HA, vision changes, F/C, LE swelling. Denies any breast pain/soreness.    Pt in stable condition and ready for discharge. She has been instructed to start and/or continue medications and follow up with her obstetrics provider as listed below.    Pertinent studies:  CBC  Recent Labs   Lab 24  0549 24  2030 24  0450   WBC 9.52 14.69* 21.86*   HGB 13.1 13.1 9.8*   HCT 36.5* 37.0 28.2*   MCV 92 93 93    195 165          Immunization History   Administered Date(s)  "Administered    COVID-19, mRNA, LNP-S, bivalent booster, PF (PFIZER OMICRON) 10/17/2022    Influenza - Quadrivalent - MDCK - PF 10/17/2022    Influenza - Trivalent - Fluarix, Flulaval, Fluzone, Afluria - PF 2024    Rsv, Bivalent, Rsvpref (Abrysvo) 2024    Tdap 2024        Delivery:    Episiotomy: None   Lacerations:     Repair suture:     Repair # of packets: 8   Blood loss (ml):       Birth information:  YOB: 2026   Time of birth: 9:14 PM   Sex: male   Delivery type: , Low Transverse   Gestational Age: 36w3d     Measurements    Weight: 2960 g  Weight (lbs): 6 lb 8.4 oz  Length: 52.1 cm  Length (in): 20.5"  Head circumference: 34.5 cm  Chest circumference: 31 cm         Delivery Clinician: Delivery Providers    Delivering clinician: Chloe Moreno MD   Provider Role    Justino Ruffin MD Resident    Viky Irwin RN Circulator    Dagmar Haynes RN Registered Nurse    Coral Smith RN Registered Nurse    Camille Maldonado RN Charge Nurse             Additional  information:  Forceps:    Vacuum:    Breech:    Observed anomalies      Living?:     Apgars    Living status: Living  Apgar Component Scores:  1 min.:  5 min.:  10 min.:  15 min.:  20 min.:    Skin color:  0  1       Heart rate:  2  2       Reflex irritability:  2  2       Muscle tone:  2  2       Respiratory effort:  2  2       Total:  8  9       Apgars assigned by: JOSE E HAYNES RNC         Placenta: Delivered:       appearance    Patient Instructions:   Current Discharge Medication List        START taking these medications    Details   acetaminophen (TYLENOL) 650 MG TbSR Take 1 tablet (650 mg total) by mouth every 6 (six) hours as needed.  Qty: 60 tablet, Refills: 0      ibuprofen (ADVIL,MOTRIN) 600 MG tablet Take 1 tablet (600 mg total) by mouth every 6 (six) hours as needed.  Qty: 60 tablet, Refills: 0      NIFEdipine (PROCARDIA-XL) 30 MG (OSM) 24 hr tablet Take 1 tablet (30 mg total) by " mouth once daily.  Qty: 30 tablet, Refills: 3    Comments: .      oxyCODONE (ROXICODONE) 5 MG immediate release tablet Take 1 tablet (5 mg total) by mouth every 4 (four) hours as needed.  Qty: 20 tablet, Refills: 0    Comments: Quantity prescribed more than 7 day supply? No  Associated Diagnoses: Status post  delivery      senna-docusate 8.6-50 mg (PERICOLACE) 8.6-50 mg per tablet Take 1 tablet by mouth daily as needed.  Qty: 30 tablet, Refills: 0           CONTINUE these medications which have NOT CHANGED    Details   famotidine (PEPCID) 10 MG tablet Take 10 mg by mouth 2 (two) times daily.      folic acid (FOLVITE) 800 MCG Tab Take 800 mcg by mouth once daily.      loratadine (CLARITIN) 5 mg chewable tablet Take 5 mg by mouth as needed for Allergies. Taking Q 6 to 8 hours PRN for headaches.      prenatal 25/iron/folate 6/dha (PRENA1 ORAL) Take by mouth.           STOP taking these medications       aspirin (ECOTRIN) 81 MG EC tablet Comments:   Reason for Stopping:         clindamycin-benzoyl peroxide gel Comments:   Reason for Stopping:               Discharge Procedure Orders   Diet Adult Regular     Pelvic Rest   Order Comments: Pelvic rest until follow up visit. Nothing in vagina -no sex, tampons, douching, etc.     Notify your health care provider if you experience any of the following:   Order Comments: - Heavy vaginal bleeding soaking through more than 2 pads/hour for > 2 hours  - Severe abdominal pain  - Headache not relieved with Tylenol  - Vision changes  - Chest pain or shortness or breath     Notify your health care provider if you experience any of the following:  increased confusion or weakness     Notify your health care provider if you experience any of the following:  persistent dizziness, light-headedness, or visual disturbances     Notify your health care provider if you experience any of the following:  severe persistent headache     Notify your health care provider if you experience any  of the following:  difficulty breathing or increased cough     Notify your health care provider if you experience any of the following:  redness, tenderness, or signs of infection (pain, swelling, redness, odor or green/yellow discharge around incision site)     Notify your health care provider if you experience any of the following:  severe uncontrolled pain     Notify your health care provider if you experience any of the following:  persistent nausea and vomiting or diarrhea     Notify your health care provider if you experience any of the following:  temperature >100.4        Follow-up Information       Anastasia Garnett MD Follow up in 3 day(s).    Specialty: Obstetrics and Gynecology  Why: BP check  Contact information:  [address]  [address]  [facility] [id number] [phone]               Anastasia Garnett MD Follow up in 6 week(s).    Specialty: Obstetrics and Gynecology  Why: Postpartum Visit  Contact information:  [address]  [address]  [facility] [id number] [phone]                              Penny Ramírez MD  OBGYN, PGY-4

## 2025-01-03 NOTE — LACTATION NOTE
Discharge lactation education reviewed with breastfeeding guide. Baby recently nursed and now asleep, mom attempting to supplement with 30mL donor milk. Baby sleepy. Mom to continue offering. Discussed using formula upon discharge as mom's milk supply increases. Use EBM as available.   Rental pump 1 month. Has baby buddha pump for home use, to order smaller flanges for personal pump.   Plan to nurse, pump, supplement. Baby's weight stable and output WDL.     Community resources given for ongoing support.      01/03/25 0905   Maternal Assessment   Breast Shape Bilateral:;round   Breast Density Bilateral:;soft   Areola Bilateral:;elastic   Nipples Bilateral:;everted   Maternal Infant Feeding   Maternal Emotional State independent   Latch Assistance no   Equipment Type   Breast Pump Type double electric, hospital grade;double electric, rental   Breast Pump Flange Type hard   Breast Pump Flange Size   (18mm flange)   Breast Pumping   Breast Pumping Interventions post-feed pumping encouraged;frequent pumping encouraged;early pumping promoted   Breast Pumping bilateral breasts pumped until soft;double electric breast pump utilized   Community Referrals   Community Referrals outpatient lactation program;support group;pediatric care provider

## 2025-01-03 NOTE — PROGRESS NOTES
VSS. Pain controlled with oral Ibuprofen. Pumping Q3, supplementing with donor milk. Plan to formula feed at home.  Fundus firm and midline with light lochia rubra. LTV dressing in place, clean/dry/intact. Voiding spontaneously with adequate output. Passing gas. Discharge instructions reviewed with patient whom verbalized understanding. Blood pressure check up in three days and post partum follow up at six weeks. No concerns at this time.

## 2025-01-03 NOTE — PROGRESS NOTES
POSTPARTUM PROGRESS NOTE    Subjective:     PPD/POD#: 4   Procedure: Primary LTCS   EGA: 36w3d   N/V: No   F/C: No   Abd Pain: Mild, well-controlled with oral pain medication   Lochia: Mild   Voiding: Yes   Ambulating: Yes   Bowel fnc: Passing gas, + BM    Contraception: Per primary OB     No complaints this am     Objective:      Temp:  [97.6 °F (36.4 °C)-98.7 °F (37.1 °C)] 97.8 °F (36.6 °C)  Pulse:  [80-96] 85  Resp:  [17-18] 18  SpO2:  [97 %-100 %] 100 %  BP: (106-140)/(70-88) 118/80    Abdomen: Soft, appropriately tender   Uterus: Firm, no fundal tenderness   Incision: Bandage in place without shadowing     Lab Review    Recent Labs   Lab 12/29/24  0549 12/29/24  2030 12/30/24  1334   * 136 133*   K 4.0 4.7 4.0    109 107   CO2 15* 15* 17*   BUN 12 10 8   CREATININE 0.9 0.9 1.0   GLU 78 76 83   PROT 6.6 6.9 6.2   BILITOT 0.3 0.3 0.4   ALKPHOS 243* 252* 230*   ALT 90* 107* 96*   AST 77* 95* 80*   MG  --   --  4.9*     Recent Labs   Lab 12/29/24  0549 12/29/24  2030 12/31/24  0450   WBC 9.52 14.69* 21.86*   HGB 13.1 13.1 9.8*   HCT 36.5* 37.0 28.2*   MCV 92 93 93    195 165     I/O  No intake or output data in the 24 hours ending 01/03/25 0646    Assessment and Plan:   Postpartum care:  - Patient doing well  - Continue routine management and advances  -holding hepatotoxic medications ISO elevated LFTs   - pt ready for discharge     PPH/Acute blood loss anemia  - H/H as above  - QBL: 1300cc  - asymptomatic  - iron/colace    PreE w/SF  - BP as above  - asymptomatic  - preE labs as above  - Mag: s/p 24h postpartum  - Hypertensive agent: procardia XL 30mg    Trina Mcfadden MD (Mary)   Obstetrics and Gynecology, PGY1

## 2025-01-05 ENCOUNTER — PATIENT MESSAGE (OUTPATIENT)
Dept: OBSTETRICS AND GYNECOLOGY | Facility: OTHER | Age: 35
End: 2025-01-05
Payer: COMMERCIAL

## 2025-01-07 ENCOUNTER — PATIENT MESSAGE (OUTPATIENT)
Dept: OBSTETRICS AND GYNECOLOGY | Facility: CLINIC | Age: 35
End: 2025-01-07
Payer: COMMERCIAL

## 2025-01-09 ENCOUNTER — POSTPARTUM VISIT (OUTPATIENT)
Dept: OBSTETRICS AND GYNECOLOGY | Facility: CLINIC | Age: 35
End: 2025-01-09
Attending: STUDENT IN AN ORGANIZED HEALTH CARE EDUCATION/TRAINING PROGRAM
Payer: COMMERCIAL

## 2025-01-09 VITALS — BODY MASS INDEX: 27.27 KG/M2 | HEIGHT: 64 IN | WEIGHT: 159.75 LBS

## 2025-01-09 DIAGNOSIS — Z30.014 ENCOUNTER FOR INITIAL PRESCRIPTION OF INTRAUTERINE CONTRACEPTIVE DEVICE (IUD): Primary | ICD-10-CM

## 2025-01-09 PROCEDURE — 99999 PR PBB SHADOW E&M-EST. PATIENT-LVL III: CPT | Mod: PBBFAC,,, | Performed by: STUDENT IN AN ORGANIZED HEALTH CARE EDUCATION/TRAINING PROGRAM

## 2025-01-09 PROCEDURE — 0503F POSTPARTUM CARE VISIT: CPT | Mod: CPTII,S$GLB,, | Performed by: STUDENT IN AN ORGANIZED HEALTH CARE EDUCATION/TRAINING PROGRAM

## 2025-01-12 ENCOUNTER — PATIENT MESSAGE (OUTPATIENT)
Dept: OBSTETRICS AND GYNECOLOGY | Facility: CLINIC | Age: 35
End: 2025-01-12
Payer: COMMERCIAL

## 2025-01-13 NOTE — PROGRESS NOTES
Subjective:      Leanna Yee is a 34 y.o.  who presents for a postpartum visit.  She is status post   delivery secondary to failed iol  1 weeks ago.  Delivery at 36 weeks due to pre e with severe features.  Home and doing well.  Baby is doing well.   She desires IUD for contraception.  Paragard She reports mood is stable.      History reviewed. No pertinent past medical history.    Current Outpatient Medications:     acetaminophen (TYLENOL) 650 MG TbSR, Take 1 tablet (650 mg total) by mouth every 6 (six) hours as needed., Disp: 60 tablet, Rfl: 0    famotidine (PEPCID) 10 MG tablet, Take 10 mg by mouth 2 (two) times daily., Disp: , Rfl:     folic acid (FOLVITE) 800 MCG Tab, Take 800 mcg by mouth once daily., Disp: , Rfl:     ibuprofen (ADVIL,MOTRIN) 600 MG tablet, Take 1 tablet (600 mg total) by mouth every 6 (six) hours as needed., Disp: 60 tablet, Rfl: 0    loratadine (CLARITIN) 5 mg chewable tablet, Take 5 mg by mouth as needed for Allergies. Taking Q 6 to 8 hours PRN for headaches., Disp: , Rfl:     NIFEdipine (PROCARDIA-XL) 30 MG (OSM) 24 hr tablet, Take 1 tablet (30 mg total) by mouth once daily., Disp: 30 tablet, Rfl: 3    oxyCODONE (ROXICODONE) 5 MG immediate release tablet, Take 1 tablet (5 mg total) by mouth every 4 (four) hours as needed., Disp: 20 tablet, Rfl: 0    prenatal 25/iron/folate 6/dha (PRENA1 ORAL), Take by mouth., Disp: , Rfl:     senna-docusate 8.6-50 mg (PERICOLACE) 8.6-50 mg per tablet, Take 1 tablet by mouth daily as needed., Disp: 30 tablet, Rfl: 0      Objective:     There were no vitals filed for this visit.    APPEARANCE: Well nourished, well developed, in no acute distress.  PSYCH: Appropriate mood and affect.  NECK:  Supple, no thyromegaly.  ABDOMEN:  Soft, nontender.  GENITOURINARY:  External genitalia normal in appearance, normal perineal body, normal urethral meatus.  Vagina with scant discharge, no blood.  No lesions.  Cervix without lesion, C/T/H.  Uterus  mobile, nontender, normal in size.  Adnexa without masses or TTP.    EXTREMITIES: No edema.      Assessment:     Encounter for initial prescription of intrauterine contraceptive device (IUD)  -     Device Authorization Order  -     [place] Limited Non-Billable; Future; Expected date: 01/09/2025        Plan:     1. Postpartum course reviewed and discussed with patient.  All questions answered.  Return to clinic in 4 weeks for pp visit and iud insertion.

## 2025-01-27 ENCOUNTER — TELEPHONE (OUTPATIENT)
Dept: OBSTETRICS AND GYNECOLOGY | Facility: CLINIC | Age: 35
End: 2025-01-27
Payer: COMMERCIAL

## 2025-02-05 ENCOUNTER — PATIENT MESSAGE (OUTPATIENT)
Dept: LACTATION | Facility: CLINIC | Age: 35
End: 2025-02-05
Payer: COMMERCIAL

## 2025-02-07 ENCOUNTER — PROCEDURE VISIT (OUTPATIENT)
Dept: OBSTETRICS AND GYNECOLOGY | Facility: CLINIC | Age: 35
End: 2025-02-07
Attending: STUDENT IN AN ORGANIZED HEALTH CARE EDUCATION/TRAINING PROGRAM
Payer: COMMERCIAL

## 2025-02-07 VITALS
DIASTOLIC BLOOD PRESSURE: 78 MMHG | BODY MASS INDEX: 26.34 KG/M2 | WEIGHT: 154.31 LBS | HEIGHT: 64 IN | SYSTOLIC BLOOD PRESSURE: 118 MMHG

## 2025-02-07 DIAGNOSIS — G43.109 MIGRAINE WITH AURA AND WITHOUT STATUS MIGRAINOSUS, NOT INTRACTABLE: ICD-10-CM

## 2025-02-07 DIAGNOSIS — Z30.430 ENCOUNTER FOR INSERTION OF MIRENA IUD: Primary | ICD-10-CM

## 2025-02-07 LAB
B-HCG UR QL: NEGATIVE
CTP QC/QA: YES

## 2025-02-07 PROCEDURE — 58300 INSERT INTRAUTERINE DEVICE: CPT | Mod: S$GLB,,, | Performed by: STUDENT IN AN ORGANIZED HEALTH CARE EDUCATION/TRAINING PROGRAM

## 2025-02-07 PROCEDURE — 99499 UNLISTED E&M SERVICE: CPT | Mod: S$GLB,,, | Performed by: STUDENT IN AN ORGANIZED HEALTH CARE EDUCATION/TRAINING PROGRAM

## 2025-02-07 PROCEDURE — 81025 URINE PREGNANCY TEST: CPT | Mod: S$GLB,,, | Performed by: STUDENT IN AN ORGANIZED HEALTH CARE EDUCATION/TRAINING PROGRAM

## 2025-02-07 NOTE — PROCEDURES
Insertion of IUD    Date/Time: 2/7/2025 11:00 AM    Performed by: Merle Middleton MD  Authorized by: Merle Middleton MD    Consent:     Consent obtained:  Prior to procedure the appropriate consent was completed and verified    Consent given by:  Patient    Procedure risks and benefits discussed: yes      Patient questions answered: yes      Patient agrees, verbalizes understanding, and wants to proceed: yes     Device to be inserted was verified by patient: yes  Insertion Procedure:   380 mm copper 380 mm2       Pelvic exam performed: yes      Negative urine pregnancy test: yes      Cervix cleaned and prepped: yes      Speculum placed in vagina: yes      Tenaculum applied to cervix: yes      Uterus sounded: yes      Uterus sound depth (cm):  8    IUD inserted with no complications: yes      IUD type:  ParaGard    Strings trimmed: yes    Post-procedure:     Patient tolerated procedure well: yes    Comments:      Pre-procedure counseling:  Discussed the risks and benefits of the IUD with the patient and answered her questions.  I discussed the possibilities of IUD expulsion, infection, and uterine perforation with the patient.  Informed consent (written and verbal) obtained and placed onto the chart prior to procedure.     Pelvic:   Normal external genitalia with no lesions, no blood or discharge in the vault, vagina pink moist and rugated with no lesions, cervix smooth and pink without lesions. IUD placed with ease and tolerated well. Tenaculum sites hemostatic with silver nitrate.     Plan:  Ibuprofen or tylenol PRN cramping.  Discussed risks of miscarriage or ectopic pregnancy if pregnancy is to occur.  Backup contraception x 7 days.  Abnormal bleeding may occur 3-6 months after placement.  Pain, fever, bleeding precautions given.  6 wk string check.    - Of note - pt also reporting migraines with aura, which she has a history, however, feels they have been more recent - she is breastfeeding - has not seen  Neurology in a few years, referral placed  - Also can discontinue procardia. BP well controlled

## 2025-03-24 ENCOUNTER — OFFICE VISIT (OUTPATIENT)
Dept: OBSTETRICS AND GYNECOLOGY | Facility: CLINIC | Age: 35
End: 2025-03-24
Attending: STUDENT IN AN ORGANIZED HEALTH CARE EDUCATION/TRAINING PROGRAM
Payer: COMMERCIAL

## 2025-03-24 VITALS — BODY MASS INDEX: 25.93 KG/M2 | WEIGHT: 151.88 LBS | HEIGHT: 64 IN

## 2025-03-24 DIAGNOSIS — Z30.431 ENCOUNTER FOR ROUTINE CHECKING OF INTRAUTERINE CONTRACEPTIVE DEVICE (IUD): Primary | ICD-10-CM

## 2025-03-24 DIAGNOSIS — Z79.899 MEDICATION MANAGEMENT: ICD-10-CM

## 2025-03-24 PROCEDURE — 99213 OFFICE O/P EST LOW 20 MIN: CPT | Mod: S$GLB,,, | Performed by: STUDENT IN AN ORGANIZED HEALTH CARE EDUCATION/TRAINING PROGRAM

## 2025-03-24 PROCEDURE — 99999 PR PBB SHADOW E&M-EST. PATIENT-LVL III: CPT | Mod: PBBFAC,,, | Performed by: STUDENT IN AN ORGANIZED HEALTH CARE EDUCATION/TRAINING PROGRAM

## 2025-03-24 PROCEDURE — 3008F BODY MASS INDEX DOCD: CPT | Mod: CPTII,S$GLB,, | Performed by: STUDENT IN AN ORGANIZED HEALTH CARE EDUCATION/TRAINING PROGRAM

## 2025-03-24 NOTE — PROGRESS NOTES
Chief Complaint: IUD String Check     HPI:      Leanna Yee 34 y.o.   presents for follow up after IUD placement approximately 1 month ago. Today she reports she is doing well. Has been sexually active with her partner without problems.  Patient's last menstrual period was 2024 (approximate).     ROS:     GENERAL: Denies unintentional weight gain or weight loss. Feeling well overall.   GYN: Denies change in discharge or vaginal bleeding.     Physical Exam:      PHYSICAL EXAM:  There were no vitals filed for this visit.      APPEARANCE: Well nourished, well developed, in no acute distress.  ABDOMEN: Soft.  No tenderness or masses.    PELVIC: Normal external genitalia without lesions.  Normal hair distribution.  Adequate perineal body, normal urethral meatus.  Vagina moist and well rugated without lesions or discharge.  Cervix pink, without lesions, discharge or tenderness. IUD strings visible at the cervical os. No significant cystocele or rectocele.  Bimanual exam shows uterus to be normal size, regular, mobile and nontender.  Adnexa without masses or tenderness.    EXTREMITIES: No edema.     Assessment/Plan:     IUD check up    RTC for annual.     Counseling:     Reviewed the length of IUD efficacy, in this case 10 years. paragard  Reviewed barrier contraception to decrease risk of STDs.

## 2025-04-02 ENCOUNTER — OFFICE VISIT (OUTPATIENT)
Dept: NEUROLOGY | Facility: CLINIC | Age: 35
End: 2025-04-02
Payer: COMMERCIAL

## 2025-04-02 VITALS
WEIGHT: 150.44 LBS | HEART RATE: 90 BPM | HEIGHT: 64 IN | DIASTOLIC BLOOD PRESSURE: 74 MMHG | BODY MASS INDEX: 25.68 KG/M2 | SYSTOLIC BLOOD PRESSURE: 118 MMHG

## 2025-04-02 DIAGNOSIS — G43.109 MIGRAINE WITH AURA AND WITHOUT STATUS MIGRAINOSUS, NOT INTRACTABLE: ICD-10-CM

## 2025-04-02 DIAGNOSIS — R20.0 NUMBNESS AND TINGLING IN RIGHT HAND: Primary | ICD-10-CM

## 2025-04-02 DIAGNOSIS — G56.23 ULNAR NEUROPATHY OF BOTH UPPER EXTREMITIES: ICD-10-CM

## 2025-04-02 DIAGNOSIS — R20.2 NUMBNESS AND TINGLING IN RIGHT HAND: Primary | ICD-10-CM

## 2025-04-02 PROCEDURE — 99999 PR PBB SHADOW E&M-EST. PATIENT-LVL III: CPT | Mod: PBBFAC,,, | Performed by: STUDENT IN AN ORGANIZED HEALTH CARE EDUCATION/TRAINING PROGRAM

## 2025-04-02 PROCEDURE — 3074F SYST BP LT 130 MM HG: CPT | Mod: CPTII,S$GLB,, | Performed by: STUDENT IN AN ORGANIZED HEALTH CARE EDUCATION/TRAINING PROGRAM

## 2025-04-02 PROCEDURE — 99204 OFFICE O/P NEW MOD 45 MIN: CPT | Mod: S$GLB,,, | Performed by: STUDENT IN AN ORGANIZED HEALTH CARE EDUCATION/TRAINING PROGRAM

## 2025-04-02 PROCEDURE — 1160F RVW MEDS BY RX/DR IN RCRD: CPT | Mod: CPTII,S$GLB,, | Performed by: STUDENT IN AN ORGANIZED HEALTH CARE EDUCATION/TRAINING PROGRAM

## 2025-04-02 PROCEDURE — 1159F MED LIST DOCD IN RCRD: CPT | Mod: CPTII,S$GLB,, | Performed by: STUDENT IN AN ORGANIZED HEALTH CARE EDUCATION/TRAINING PROGRAM

## 2025-04-02 PROCEDURE — 3008F BODY MASS INDEX DOCD: CPT | Mod: CPTII,S$GLB,, | Performed by: STUDENT IN AN ORGANIZED HEALTH CARE EDUCATION/TRAINING PROGRAM

## 2025-04-02 PROCEDURE — 3078F DIAST BP <80 MM HG: CPT | Mod: CPTII,S$GLB,, | Performed by: STUDENT IN AN ORGANIZED HEALTH CARE EDUCATION/TRAINING PROGRAM

## 2025-04-02 RX ORDER — RIZATRIPTAN BENZOATE 10 MG/1
TABLET ORAL
Qty: 8 TABLET | Refills: 5 | Status: SHIPPED | OUTPATIENT
Start: 2025-04-02

## 2025-04-02 NOTE — PROGRESS NOTES
Patient ID: [id number]  Referring Physician: Merle Middleton MD    Chief Complaint/Reason for Consult: Headaches  Subjective:     HPI:  Leanna eYe is a pleasant 34 y.o. LH female with migraines with aura and Hx of pre-eclampsia. she is presenting today as a new patient for evaluation of worsening headaches.     Headache history  Age at onset: teenage years, significantly improved after getting a copper IUD in 2012   Course over time: increasing in frequency since during postpartum since December  Location: frontal and bilateral temples  Character: dull and pounding  Intensity: 3-6 on a scale from 1 to 10  Frequency: used to be x2 per year, now x3-4 per month  Duration: several hours  Timing: recently morning headaches are more frequent   Mild/moderate/severe. Work attendance or other daily activities are affected by the headaches.  Aura: preceded by an aura consisting of enlarging central scotoma on the right  Associated symptoms: Photosensitivity and Phonophobia   Associated neurologic symptoms: dizziness, numbness in the face, tongue, and hand on the right  Precipitating factors: Weather changes and Menstrual periods  Aggravating factors: Physical activity   Home treatment: Goodies/Excedrin and coca cola with some improvement.   ER visits: No  Positive Hx of: No Head trauma  Is medication overuse contributing to the patient's current migraine burden: No      On another note she has been noticing numbness and tingling on lateral aspect of right thumb and ulnar aspect of bilateral forearms that started mid pregnancy.  She is currently breast feeding.    Headache Medication history:  AED Neuromodulators  MAOIs  Ergot Alkaloids    Acetazolamide (Diamox) [] Phenelzine (Nardil) [] Dihydroergotamine (Migranal) []   Carbamazepine (Tegretol) [] Tranylcypromine (Parnate) [] Ergotamine (Ergomar) []   Gabapentin (Neurontin) [] Antihistamine/Serotonergic  Triptans    Lacosamide (Vimpat) [] Cyproheptadine  (Periactin) [] Almotriptan (Axert) []   Lamotrigine (Lamictal) [] Antihypertensives  Eletriptan (Relpax) []   Levatiracetam (Keppra) [] Atenolol (Tenormin) [] Frovatriptan (Frova) []   Oxcarbazepine (Trileptal) [] Bisoprostol (Zebeta) [] Naratriptan (Amerge) []   Levetiracetam (Keppra) [] Candesartan (Atacand) [] Rizatriptan (Maxalt) []   Pregabalin (Lyrica) [] Carvedilol (Coreg)  Sumatriptan (Imitrex) [x]   Topiramate (Topamax)  (Trokendi) [] Diltiazem (Cardizem) [] Zolmitriptan (Zomig) []   Valproic Acid (Depakote) (Divalproex Sodium) [] Lisinopril (Prinivil, Zestril) [] Combo Abortives    Zonisamide (Zonegran) [] Metoprolol (Toprol) [] BC Powder    Muscle relaxants  Nadolol (Corgard) [] Butalbital and Acetaminophen (Bupap) []   Methocarbamol (Robaxin) [] Nebivolol (Bystolic) [] Butalbital, Acetaminophen, and caffeine (Fioricet) []   Baclofen (Lioresal) [] Nicardipine (Cardene) []     Cyclobenzaprine (Flexeril) [] Nimodipine (Nimotop) [] Butalbital, Aspirin, and caffeine (Fiorinal) []   Tizanidine (Zanaflex) [] Propranolol (Inderal) []     Benzodiazepines  Telmisartan (Micardis) [] Butalbital, Caffeine, Acetaminophen, and Codeine (Fioricet with Codeine) []   Clonazepam (Klonopin) [] Timolol (Blocadren) []     Alprazolam (Xanax) [] Verapamil (Calan, Verelan) [] Butalbital, Caffeine, Aspirin, and Codeine  (Fiorinal with Codeine) []   Diazepam (Valium) [] NSAIDs      Lorazepam (Ativan) [] Acetaminophen (Tylenol) []     Antidepressants   Acetylsalicylic Acid (Aspirin) [] Aspirin, Caffeine, and Acetaminophen (Excedrin) (Goodys) [x]   Amitriptyline (Elavil) [] Celecoxib (Celebrex) []     Bupropion (Wellbutrin) [] Diclofenac (Cambia) []     Citalopram (Celexa) [] Ibuprofen (Motrin, Advil) [] Acetaminophen, Caffeine, Pyrilamine maleate (Midol) []   Desipramine (Norpramin) [] Indomethacin (Indocin) []     Desvenlafazine (Pristiq) [] Ketoprofen (Orudis) [] Acetaminophen, Dichloralphenazone, and Isometheptene (Midrin) []    Doxepin (Sinequan) [] Ketorolac (Toradol) []     Duloxetine (Cymbalta) [] Naproxen (Anaprox, Aleve) []     Escitalopram (Lexapro) [] Meclofenamic Acid (Meclomen) [] Procedures    Fluoxetine (Prozac) [] Meloxicam (Mobic) [] Greater occipital nerve block []   Imipramine (Tofranil) [] Monoclonals  Cervical radiofrequency ablation []   Nortriptyline (Pamelor) [] Erenumab-aooe (Aimovig) [] Spenopalatine ganglion block []   Protriptyline (Vivactil) [] Galcanezumab (Emgality) [] Occipital neuro stimulation []   Trazodone (Desyrel, Oleptro) [] Fremanazumab-vfrm (Ajovy) [] Cervical Epidural steroid injection []   Venlafaxine (Effexor) [] Eptinezumab (Vyepti) [] Facet joint injections []   Oral CGRP inhibitors  Neuromodulation devices   Transforaminal epidural steroid injection []   Atogepant (Qulipta) [] Cefaly [] Cervical medial branch blocks []   Rimegepant (Nurtec) [] Gamma Core [] Botox []   Ubrogepant (Ubrelvy) [] Nerivio [] iovera []   Zavegepant (Zavzpret) [] Transcranial Magnetic stimulation [] Antiemetics    Other    Prochlorperazine (Compazine) []   Memantine (Namenda) []   Metoclopramide (Reglan)  []       Promethazine (Phenergan)  []       Ondansetron (Zofran) []       Meclizine (Antivert, Dramamine) []     Review of Systems:  Review of Systems   HENT:  Negative for congestion, sinus pain and tinnitus.    Eyes:  Positive for photophobia. Negative for blurred vision and double vision.        Visual disturbance   Gastrointestinal:  Positive for nausea. Negative for vomiting.   Musculoskeletal:  Negative for falls and neck pain.   Neurological:  Positive for tingling, sensory change and headaches. Negative for dizziness and focal weakness.   Psychiatric/Behavioral:  The patient does not have insomnia.    All other systems reviewed and are negative.     Past Medical History:  No past medical history on file.    Allergies:  Review of patient's allergies indicates:   Allergen Reactions    Prochlorperazine Other (See  Comments)     Lost control of neck and eye muscles       Pertinent Family History:  Family History   Problem Relation Name Age of Onset    Heart disease Father      Breast cancer Neg Hx      Colon cancer Neg Hx      Ovarian cancer Neg Hx         Pertinent Social History:  Social History[1]    Medications:  Current Outpatient Medications   Medication Instructions    acetaminophen (TYLENOL) 650 mg, Oral, Every 6 hours PRN    famotidine (PEPCID) 10 mg, 2 times daily    folic acid (FOLVITE) 800 mcg, Daily    ibuprofen (ADVIL,MOTRIN) 600 mg, Oral, Every 6 hours PRN    loratadine (CLARITIN) 5 mg, As needed (PRN)    NIFEdipine (PROCARDIA-XL) 30 mg, Oral, Daily    prenatal 25/iron/folate 6/dha (PRENA1 ORAL) Take by mouth.    senna-docusate 8.6-50 mg (PERICOLACE) 8.6-50 mg per tablet 1 tablet, Oral, Daily PRN        Objective:     Vitals:    04/02/25 0806   BP: 118/74   Pulse: 90        General:  Well-appearing, well-nourished, NAD, cooperative  MSK: no TTP on occipital, cervical paraspinal muscles or traps    Neurologic Exam:   Awake, alert and oriented x3  Speech spontaneous and fluent, intact comprehension.   Adequate fund of knowledge, vocabulary.    CN II - CN XII:  PERRLA. EOM intact. No Nystagmus. No ophthalmoplegia.   Facial sensation is normal to light touch.   Facial expression is full and symmetric.   Hearing is intact bilaterally.   Palate elevates symmetrically.   SCM and Trapezius full strength bilaterally.   Tongue is midline.     Motor:  Normal bulk and tone in all four limbs.   There are no atrophy or fasciculations. No tremor.     Shoulder  Abd Shoulder Add Elbow   Flex Elbow  Ext Wrist   Flex Wrist  Ext Finger  Flex Finger  Ext Finger  Abd Finger   Add IO Opposition   Right 5 5 5 5       5    Left 5 5 5 5       5       Hip  Flex Hip  Ext Thigh   Abd Thigh  Add Knee  Flex Knee  Ext Plantar  Flex Dorsiflex   Right 5 5   5 5 5 5   Left 5 5   5 5 5 5     Sensory:  Light touch: reduced tactile sense on lateral  right thumb, Tinel's slightly positive on the right wrist but not the elbows.  Proprioception: proprioceptive sense present on RUE and on LUE  Romberg is Exam: negative    DTRs:   Biceps Brachioradialis Triceps Tala Patellar Ankle Plantar   Right 2+ 2+  - 2+     Left 2+ 2+  - 2+       Coordination:  Finger to nose is normal bilaterally.  Normal fine finger movements and rapid alternating movements.    Gait:  Normal casual and tandem gait.    Pertinent lab results    Lab Results   Component Value Date    RNI83SLEV Negative 12/29/2024    HEPBSAG Non-reactive 06/28/2024     Lab Results   Component Value Date    TSH 3.261 06/28/2024    WBC 21.86 (H) 12/31/2024    LYMPH 1.3 12/31/2024    LYMPH 6.0 (L) 12/31/2024    RBC 3.03 (L) 12/31/2024    HGB 9.8 (L) 12/31/2024    HCT 28.2 (L) 12/31/2024    MCV 93 12/31/2024     12/31/2024     (L) 12/30/2024    K 4.0 12/30/2024    CO2 17 (L) 12/30/2024    BUN 8 12/30/2024    CREATININE 1.0 12/30/2024    CALCIUM 8.4 (L) 12/30/2024    AST 80 (H) 12/30/2024    ALT 96 (H) 12/30/2024       Pertinent imaging results  *No relevant imaging available to review     Other pertinent studies  None    Assessment:   Leanna Yee is a 34 y.o. LH female with migraines with aura and Hx of pre-eclampsia who presents for evaluation of worsening headaches and new hand and forearm numbness and tingling. Characteristics meet the criteria for episodic migraines with aura, most likely exacerbated by lack of sleep, exhaustion, and hormonal fluctuations during postpartum and breastfeeding. she is having <6 headache days per month therefore a daily preventative treatment is not warranted but she could still benefit from daily magnesium. For rescue, we will do a trial of Maxalt (rizatriptan), triptans are generally safe during breastfeeding but she may pump and dump 2 hours after taking the pill to be very safe. Exam is suggestive of mild right CTS, she may have onset of ulnar nerve  irritation as well, we will proceed with EMG-NCS to investigate. In the interim she will take conservative measures.     1. Numbness and tingling in right hand    2. Migraine with aura and without status migrainosus, not intractable    3. Ulnar neuropathy of both upper extremities      Plan:     - magnesium oxide 400 mg daily to reduce frequency, intensity, and duration of migraine attacks  - rizatriptan (MAXALT) 10 MG tablet; Take one tablet (10 mg) at the onset of headaches; if symptoms persist or return, may repeat dose after 2 hours. maximum dose: 20 mg per 24 hours  Dispense: 8 tablet; Refill: 5  - EMG W/ ULTRASOUND AND NERVE CONDUCTION TEST 2 Extremities; Future  - recommend using a carpal tunnel wrist brace especially during sleep, avoid resting on the elbows        Disclaimer: This note was partly generated using dictation software which may occasionally result in transcription errors that are missed on review.      Based on our encounter today, my overall Medical Decision Making is a Level 4     Complexity of Problem: Moderate (1 or more chronic illnesses with exacerbation, progression or treatment side effects)  Complexity of Data: Moderate (Review of >3 unique test results and Ordering each unique test)  Risk of Complications and/or morbidity/mortality of Management: Moderate risk (Prescription drug management)        Joann Ward MD    [facility]  04/02/2025          [1]   Social History  Tobacco Use    Smoking status: Never     Passive exposure: Never    Smokeless tobacco: Never   Substance Use Topics    Alcohol use: Yes     Comment: socially    Drug use: Never

## 2025-04-07 ENCOUNTER — PROCEDURE VISIT (OUTPATIENT)
Dept: NEUROLOGY | Facility: CLINIC | Age: 35
End: 2025-04-07
Payer: COMMERCIAL

## 2025-04-07 DIAGNOSIS — G56.23 ULNAR NEUROPATHY OF BOTH UPPER EXTREMITIES: ICD-10-CM

## 2025-04-07 DIAGNOSIS — R20.0 NUMBNESS AND TINGLING IN RIGHT HAND: ICD-10-CM

## 2025-04-07 DIAGNOSIS — R20.2 NUMBNESS AND TINGLING IN RIGHT HAND: ICD-10-CM

## 2025-04-07 PROCEDURE — 95886 MUSC TEST DONE W/N TEST COMP: CPT | Mod: S$GLB,,, | Performed by: PHYSICAL MEDICINE & REHABILITATION

## 2025-04-07 PROCEDURE — 95911 NRV CNDJ TEST 9-10 STUDIES: CPT | Mod: S$GLB,,, | Performed by: PHYSICAL MEDICINE & REHABILITATION

## 2025-04-07 NOTE — PROCEDURES
Sister called back after giving me the code updated her  And answered all questions.    Test Date:  2025    Patient: Leanna Yee : 1990 Physician: Rj Bhagat D.O.   ID#: [id number] SEX: Female Ref. Phys: Joann Ward MD     HPI: Leanna Yee is a 34 y.o.female who presents for NCS/EMG to evaluate for CTS on the right and ulnar neuropathy bilaterally.      PROCEDURE:  Prior to the procedure, the procedure was discussed in detail with the patient.  All questions were answered, and verbal consent was obtained.  For nerve conduction studies, a combination of surface electrodes, bar electrodes, and/or ring electrodes were used as needed.  For needle EMG, each site was cleaned and prepped in usual fashion with an alcohol pad.  A monopolar needle (28G) was used.  There was no significant bleeding, and bandages were applied as needed.  The procedure was tolerated without adverse effect.  The patient was instructed on post-procedure care including ice if needed for 10-15 minutes up to 4 times/day for any sore muscles.  I discussed with the patient that the data would be reviewed and a report sent to the referring provider, where any follow up questions regarding next steps should be directed.        NCV & EMG Findings:  All nerve conduction studies (as indicated in the following tables) were within normal limits.  All examined muscles (as indicated in the following table) showed no evidence of electrical instability.      IMPRESSIONS:  This is a normal electrophysiologic study of the bilateral upper extremities.          ___________________________  Rj Bhagat D.O.        NCS+  Motor Nerve Results      Latency Amplitude F-Lat Segment Distance CV Comment   Site (ms) Norm (mV) Norm (ms)  (cm) (m/s) Norm    Left Median (APB)   Wrist 3.2  < 4.4 10.1  > 5.9         Elbow 7.5 - 7.5 -  Elbow-Wrist 24 56  > 53    Right Median (APB)   Wrist 3.2  < 4.4 11.7  > 5.9         Elbow 7.1 - 11.2 -  Elbow-Wrist 22 56  > 53    Left Ulnar (ADM)   Wrist 2.5  < 3.7 11.1  > 3.0          Bel Elbow 6.5 - 10.2 -  Bel Elbow-Wrist 28 70  > 52    Abv Elbow 7.6 - 9.9 -  Abv Elbow-Bel Elbow 8 73  > 43    Right Ulnar (ADM)   Wrist 2.6  < 3.7 10.7  > 3.0         Bel Elbow 6.3 - 9.8 -  Bel Elbow-Wrist 22 59  > 52    Abv Elbow 7.3 - 9.6 -  Abv Elbow-Bel Elbow 8 80  > 43      Sensory Nerve Results      Start Lat Latency (Peak) Amplitude (P-P) Segment Distance Start CV Comment   Site (ms) Norm (ms) (µV) Norm  (cm) (m/s) Norm    Left Median (Rec:Dig II)   Wrist 2.7  < 3.3 3.5 63  > 13 Wrist-Dig II 14 52  > 42    Right Median   Wrist-Dig I 2.2 - 2.8 43 - Wrist-Dig I 10 45 -    Wrist-Dig II 2.8  < 3.3 3.6 64  > 13 Wrist-Dig II 14 50  > 42    Palm-Wrist 1.30 - 1.73 102 - Palm-Wrist - - -    Left Ulnar (Rec:Dig V)   Wrist 2.5  < 3.1 3.3 50  > 8 Wrist-Dig V 14 56  > 45    Right Ulnar   Wrist-Dig V 2.8  < 3.1 3.6 69  > 8 Wrist-Dig V 14 50  > 45    Palm-Wrist 1.28 - 1.78 22 - Palm-Wrist - - -    Right Radial   Wrist-Dig I 1.93 - 2.5 19 - Wrist-Dig I 10 52 -    Forearm-Wrist 1.33  < 2.2 1.88 12  > 11 Forearm-Wrist 10 75 -      Inter-Nerve Comparisons     Nerve 1 Value 1 Nerve 2 Value 2 Parameter Result Normal   Sensory Sites   R Median Wrist-Dig I 2.8 ms R Radial Wrist-Dig I 2.5 ms Peak Lat Diff 0.30 ms <0.40   R Median Palm-Wrist 1.73 ms R Ulnar Palm-Wrist 1.78 ms Peak Lat Diff 0.05 ms <0.30     EMG+     Side Muscle Nerve Root Ins Act Fibs Psw Amp Dur Poly Recrt Int Pat Comment   Right Deltoid Axillary C5-C6 Nml Nml Nml Nml Nml 0 Nml Nml    Right Biceps Musculocut C5-C6 Nml Nml Nml Nml Nml 0 Nml Nml    Right Triceps Radial C6-C8 Nml Nml Nml Nml Nml 0 Nml Nml    Right Pronator Teres Median C6-C7 Nml Nml Nml Nml Nml 0 Nml Nml    Right FDI Ulnar C8-T1 Nml Nml Nml Nml Nml 0 Nml Nml            Waveforms:    Motor              Sensory

## 2025-04-08 ENCOUNTER — RESULTS FOLLOW-UP (OUTPATIENT)
Dept: NEUROLOGY | Facility: CLINIC | Age: 35
End: 2025-04-08

## 2025-05-07 ENCOUNTER — PATIENT MESSAGE (OUTPATIENT)
Dept: OBSTETRICS AND GYNECOLOGY | Facility: CLINIC | Age: 35
End: 2025-05-07
Payer: COMMERCIAL

## 2025-05-29 ENCOUNTER — PATIENT MESSAGE (OUTPATIENT)
Dept: OBSTETRICS AND GYNECOLOGY | Facility: CLINIC | Age: 35
End: 2025-05-29
Payer: COMMERCIAL